# Patient Record
Sex: MALE | Race: BLACK OR AFRICAN AMERICAN | Employment: OTHER | URBAN - METROPOLITAN AREA
[De-identification: names, ages, dates, MRNs, and addresses within clinical notes are randomized per-mention and may not be internally consistent; named-entity substitution may affect disease eponyms.]

---

## 2018-07-19 ENCOUNTER — HOSPITAL ENCOUNTER (OUTPATIENT)
Dept: CT IMAGING | Age: 68
Discharge: HOME | End: 2018-07-19
Attending: FAMILY MEDICINE
Payer: COMMERCIAL

## 2018-07-19 DIAGNOSIS — R04.2 COUGHING UP BLOOD: ICD-10-CM

## 2018-07-19 LAB — CREAT BLD-MCNC: 0.8 MG/DL (ref 0.6–1.3)

## 2018-07-19 PROCEDURE — 71260 CT THORAX DX C+: CPT

## 2018-07-19 PROCEDURE — 74011250636 HC RX REV CODE- 250/636: Performed by: RADIOLOGY

## 2018-07-19 PROCEDURE — 74011636320 HC RX REV CODE- 636/320: Performed by: RADIOLOGY

## 2018-07-19 PROCEDURE — 82565 ASSAY OF CREATININE: CPT

## 2018-07-19 RX ORDER — SODIUM CHLORIDE 0.9 % (FLUSH) 0.9 %
10 SYRINGE (ML) INJECTION
Status: COMPLETED | OUTPATIENT
Start: 2018-07-19 | End: 2018-07-19

## 2018-07-19 RX ORDER — SODIUM CHLORIDE 9 MG/ML
50 INJECTION, SOLUTION INTRAVENOUS
Status: COMPLETED | OUTPATIENT
Start: 2018-07-19 | End: 2018-07-19

## 2018-07-19 RX ADMIN — Medication 10 ML: at 09:19

## 2018-07-19 RX ADMIN — IOPAMIDOL 80 ML: 612 INJECTION, SOLUTION INTRAVENOUS at 09:19

## 2018-07-19 RX ADMIN — SODIUM CHLORIDE 50 ML/HR: 900 INJECTION, SOLUTION INTRAVENOUS at 09:19

## 2019-03-01 ENCOUNTER — HOSPITAL ENCOUNTER (OUTPATIENT)
Dept: CT IMAGING | Age: 69
Discharge: HOME | End: 2019-03-01
Attending: INTERNAL MEDICINE
Payer: COMMERCIAL

## 2019-03-01 DIAGNOSIS — R04.2 HEMOPTYSIS: ICD-10-CM

## 2019-03-01 LAB — CREAT BLD-MCNC: 0.8 MG/DL (ref 0.6–1.3)

## 2019-03-01 PROCEDURE — 74011636320 HC RX REV CODE- 636/320: Performed by: INTERNAL MEDICINE

## 2019-03-01 PROCEDURE — 71275 CT ANGIOGRAPHY CHEST: CPT

## 2019-03-01 PROCEDURE — 82565 ASSAY OF CREATININE: CPT

## 2019-03-01 RX ORDER — SODIUM CHLORIDE 0.9 % (FLUSH) 0.9 %
10 SYRINGE (ML) INJECTION
Status: COMPLETED | OUTPATIENT
Start: 2019-03-01 | End: 2019-03-01

## 2019-03-01 RX ADMIN — Medication 10 ML: at 14:20

## 2019-03-01 RX ADMIN — IOPAMIDOL 100 ML: 755 INJECTION, SOLUTION INTRAVENOUS at 14:20

## 2019-03-13 ENCOUNTER — HOSPITAL ENCOUNTER (OUTPATIENT)
Age: 69
Setting detail: OUTPATIENT SURGERY
Discharge: HOME | End: 2019-03-13
Attending: INTERNAL MEDICINE | Admitting: INTERNAL MEDICINE
Payer: MEDICARE

## 2019-03-13 VITALS
WEIGHT: 222 LBS | HEART RATE: 74 BPM | DIASTOLIC BLOOD PRESSURE: 66 MMHG | HEIGHT: 68 IN | OXYGEN SATURATION: 90 % | TEMPERATURE: 97.9 F | RESPIRATION RATE: 13 BRPM | BODY MASS INDEX: 33.65 KG/M2 | SYSTOLIC BLOOD PRESSURE: 92 MMHG

## 2019-03-13 LAB
APPEARANCE FLD: ABNORMAL
COLOR FLD: COLORLESS
EOSINOPHIL NFR FLD MANUAL: 4 %
LYMPHOCYTES NFR FLD: 26 %
MESOTHL CELL NFR FLD: 2 %
MONOS+MACROS NFR FLD: 11 %
NEUTROPHILS NFR FLD: 57 %
NUC CELL # FLD: 80 /CU MM
RBC # FLD: >100 /CU MM
SPECIMEN SOURCE FLD: ABNORMAL

## 2019-03-13 PROCEDURE — 87070 CULTURE OTHR SPECIMN AEROBIC: CPT

## 2019-03-13 PROCEDURE — 89050 BODY FLUID CELL COUNT: CPT

## 2019-03-13 PROCEDURE — 74011250636 HC RX REV CODE- 250/636

## 2019-03-13 PROCEDURE — 87102 FUNGUS ISOLATION CULTURE: CPT

## 2019-03-13 PROCEDURE — 76040000019: Performed by: INTERNAL MEDICINE

## 2019-03-13 PROCEDURE — 77030022556 HC FCPS BIOP TIS ENDOSC BSC -B: Performed by: INTERNAL MEDICINE

## 2019-03-13 PROCEDURE — 88112 CYTOPATH CELL ENHANCE TECH: CPT

## 2019-03-13 PROCEDURE — 77030012699 HC VLV SUC CNTRL OCOA -A: Performed by: INTERNAL MEDICINE

## 2019-03-13 PROCEDURE — 87116 MYCOBACTERIA CULTURE: CPT

## 2019-03-13 PROCEDURE — 74011250636 HC RX REV CODE- 250/636: Performed by: INTERNAL MEDICINE

## 2019-03-13 PROCEDURE — 74011000250 HC RX REV CODE- 250: Performed by: INTERNAL MEDICINE

## 2019-03-13 RX ORDER — FENTANYL CITRATE 50 UG/ML
50 INJECTION, SOLUTION INTRAMUSCULAR; INTRAVENOUS
Status: DISCONTINUED | OUTPATIENT
Start: 2019-03-13 | End: 2019-03-13 | Stop reason: HOSPADM

## 2019-03-13 RX ORDER — FENTANYL CITRATE 50 UG/ML
INJECTION, SOLUTION INTRAMUSCULAR; INTRAVENOUS
Status: DISCONTINUED
Start: 2019-03-13 | End: 2019-03-13 | Stop reason: HOSPADM

## 2019-03-13 RX ORDER — LIDOCAINE HYDROCHLORIDE 20 MG/ML
5 SOLUTION OROPHARYNGEAL ONCE
Status: COMPLETED | OUTPATIENT
Start: 2019-03-13 | End: 2019-03-13

## 2019-03-13 RX ORDER — FLUMAZENIL 0.1 MG/ML
0.2 INJECTION INTRAVENOUS
Status: DISCONTINUED | OUTPATIENT
Start: 2019-03-13 | End: 2019-03-13 | Stop reason: HOSPADM

## 2019-03-13 RX ORDER — LIDOCAINE HYDROCHLORIDE 20 MG/ML
JELLY TOPICAL AS NEEDED
Status: DISCONTINUED | OUTPATIENT
Start: 2019-03-13 | End: 2019-03-13 | Stop reason: HOSPADM

## 2019-03-13 RX ORDER — NALOXONE HYDROCHLORIDE 0.4 MG/ML
0.4 INJECTION, SOLUTION INTRAMUSCULAR; INTRAVENOUS; SUBCUTANEOUS
Status: DISCONTINUED | OUTPATIENT
Start: 2019-03-13 | End: 2019-03-13 | Stop reason: HOSPADM

## 2019-03-13 RX ORDER — MIDAZOLAM HYDROCHLORIDE 1 MG/ML
INJECTION, SOLUTION INTRAMUSCULAR; INTRAVENOUS
Status: DISCONTINUED
Start: 2019-03-13 | End: 2019-03-13 | Stop reason: HOSPADM

## 2019-03-13 RX ORDER — SODIUM CHLORIDE 9 MG/ML
75 INJECTION, SOLUTION INTRAVENOUS CONTINUOUS
Status: DISCONTINUED | OUTPATIENT
Start: 2019-03-13 | End: 2019-03-13 | Stop reason: HOSPADM

## 2019-03-13 RX ORDER — MIDAZOLAM HYDROCHLORIDE 1 MG/ML
.25-5 INJECTION, SOLUTION INTRAMUSCULAR; INTRAVENOUS
Status: DISCONTINUED | OUTPATIENT
Start: 2019-03-13 | End: 2019-03-13 | Stop reason: HOSPADM

## 2019-03-13 RX ORDER — LANOLIN ALCOHOL/MO/W.PET/CERES
CREAM (GRAM) TOPICAL
COMMUNITY

## 2019-03-13 RX ORDER — LIDOCAINE HYDROCHLORIDE 40 MG/ML
30 SOLUTION TOPICAL AS NEEDED
Status: DISCONTINUED | OUTPATIENT
Start: 2019-03-13 | End: 2019-03-13 | Stop reason: HOSPADM

## 2019-03-13 RX ORDER — LIDOCAINE HYDROCHLORIDE 20 MG/ML
JELLY TOPICAL AS NEEDED
Status: DISCONTINUED | OUTPATIENT
Start: 2019-03-13 | End: 2019-03-13

## 2019-03-13 RX ORDER — SODIUM CHLORIDE 0.9 % (FLUSH) 0.9 %
5-40 SYRINGE (ML) INJECTION EVERY 8 HOURS
Status: DISCONTINUED | OUTPATIENT
Start: 2019-03-13 | End: 2019-03-13 | Stop reason: HOSPADM

## 2019-03-13 RX ORDER — SODIUM CHLORIDE 0.9 % (FLUSH) 0.9 %
5-40 SYRINGE (ML) INJECTION AS NEEDED
Status: DISCONTINUED | OUTPATIENT
Start: 2019-03-13 | End: 2019-03-13 | Stop reason: HOSPADM

## 2019-03-13 RX ADMIN — FENTANYL CITRATE 25 MCG: 50 INJECTION, SOLUTION INTRAMUSCULAR; INTRAVENOUS at 10:02

## 2019-03-13 RX ADMIN — FENTANYL CITRATE 25 MCG: 50 INJECTION, SOLUTION INTRAMUSCULAR; INTRAVENOUS at 09:59

## 2019-03-13 RX ADMIN — MIDAZOLAM HYDROCHLORIDE 0.5 MG: 1 INJECTION, SOLUTION INTRAMUSCULAR; INTRAVENOUS at 09:59

## 2019-03-13 RX ADMIN — LIDOCAINE HYDROCHLORIDE 5 ML: 20 SOLUTION ORAL; TOPICAL at 09:47

## 2019-03-13 RX ADMIN — SODIUM CHLORIDE 75 ML/HR: 900 INJECTION, SOLUTION INTRAVENOUS at 09:32

## 2019-03-13 RX ADMIN — LIDOCAINE HYDROCHLORIDE 20 ML: 40 SOLUTION TOPICAL at 10:02

## 2019-03-13 RX ADMIN — LIDOCAINE HYDROCHLORIDE: 20 JELLY TOPICAL at 10:00

## 2019-03-13 RX ADMIN — MIDAZOLAM HYDROCHLORIDE 0.5 MG: 1 INJECTION, SOLUTION INTRAMUSCULAR; INTRAVENOUS at 09:55

## 2019-03-13 RX ADMIN — FENTANYL CITRATE 25 MCG: 50 INJECTION, SOLUTION INTRAMUSCULAR; INTRAVENOUS at 09:55

## 2019-03-13 RX ADMIN — MIDAZOLAM HYDROCHLORIDE 0.5 MG: 1 INJECTION, SOLUTION INTRAMUSCULAR; INTRAVENOUS at 10:02

## 2019-03-13 NOTE — PROCEDURES
[facility]  Pulmonary, Critical Care, and Sleep Medicine    Name: Sameer Snyder. MRN: [id number]   : 1950 Hospital: [facility]   Date: 3/13/2019        Bronchoscopy Report    Procedure: Diagnostic bronchoscopy. Indication: Abnormal chest imaging and Hemoptysis    Consent/Treatment: Informed consent was obtained from the  patient after risks, benefits and alternatives were explained. Timeout verified the correct patient and correct procedure. Anesthesia:   Topical sedation to nares, mouth, and tracheobronchial tree with lidocaine  Moderate sedation with Fentanyl 75 mcg was used Versed 1.5mg. Both IV>   Moderate (conscious) sedation was administered by the endoscopy nurse and supervised by the endoscopist.  The following parameters were monitored: oxygen saturation, heart rate, blood pressure, respiratory rate, EKG, adequacy of pulmonary ventilation, and response to care. Total physician intraservice time was 20 minutes. Procedure Details:   -- The bronchoscope was introduced through the  right nare. -- The vocal cords were found to be normal.  -- The trachea and adam were completely inspected and were found to be normal.  -- The right-sided endobronchial anatomy was completely inspected and were found to be normal.  -- The left-sided endobronchial anatomy was completely inspected and were found to be normal.     Pt was placed in proper position. The scope was wedge into the RML  Into different subsegments and BAL was performed for a total of 6 lavages of 120 ml and return of 50 mls. Did not appear to be alveolvar hemorrhage with serial lavages.      Specimens:   Bronchial washings were sent for  microbiology, cytology, AFB smear and culture and fungal culture  The bronchoscope was wedged in the RML and bronchoalveolar lavage was performed; material was sent for  microbiology, cytology, AFB smear and culture and fungal culture      Complications: none    Estimated Blood Loss: Minimal    Adelia Yao MD

## 2019-03-13 NOTE — DISCHARGE INSTRUCTIONS
[facility] [place]  Pulmonary, Critical Care, and Sleep Medicine    Name: Alison Ramirez. MRN: [id number]   : 1950 Hospital: [facility]   Date: 3/13/2019        BRONCHOSCOPY DISCHARGE INSTRUCTIONS  Discomfort:  Sore throat- throat lozenges or warm salt water gargle  redness at IV site- apply warm compress to area; if redness or soreness persist- contact your physician  Gaseous discomfort- walking, belching will help relieve any discomfort  Should not operate a vehicle for at least 12 hours  You should not engage in an occupation involving machinery or appliances for rest of today  You should not drink alcoholic beverages for at least 12 hours  Avoid making any critical decisions for at least 24 hour  Blood tinged secretions - this should stop within 2-3 hours  DIET  Nothing by mouth- do not eat or drink for two hours. You may eat and drink after ***  ACTIVITY  You {Actions; may/not:75330:s} resume your normal daily activities however it is recommended that you spend the remainder of the day resting -  avoid any strenuous activity. CALL M.D. FOR ANY SIGN OF   Increasing pain, nausea, vomiting  New increased bleeding  Fever (chills)  Pain in chest area  Bloody discharge from nose or mouth  Shortness of breath  Bubbles under the skin around the collarbone. These may crackle and pop when you press on them. Coughing up more than ½ cup of blood. Call [phone] office for the following  Results of procedure / biopsy in 7 days  Telephone #  [phone]  Additional instructions: If you have not heard the results of your test by  please call the phone number listed above.

## 2019-03-13 NOTE — ROUTINE PROCESS
Alison Ramirez.  [birth year]  [id number]    Situation:  Verbal report received from: [facility] RN   Procedure: Procedure(s):  BRONCHOSCOPY  BRONCHIAL WASHINGS FLEXIBLE  BRONCHIAL ALVEOLAR LAVAGE    Background:    Preoperative diagnosis: HEMOPTYSIS  Postoperative diagnosis: hemoptysis    :  Dr. Emerson Vanessa  Assistant(s): Endoscopy RN-1: Stephania Ojeda RN  Endoscopy RN-2: Warren Vu RN    Specimens:   ID Type Source Tests Collected by Time Destination   1 : Right middle lobe Bronchial Washing Bronchoalveolar Lavage (BAL) CULTURE, FUNGUS, AFB CULTURE + SMEAR W/RFLX PCR AND ID, CULTURE, RESPIRATORY/SPUTUM/BRONCH W GRAM STAIN, CELL COUNT, BODY FLUID Germania Brown MD 3/13/2019 1012 Microbiology   1 : right middle lobe Fresh Bronchoalveolar Lavage (BAL)  Germania Brown MD 3/13/2019 1014 Cytology     H. Pylori  no    Assessment:  Intra-procedure medications   Versed  1.5 mg  Fentanyl 75 mcg    Anesthesia gave intra-procedure sedation and medications, see anesthesia flow sheet no      Intravenous fluids: NS@ KVO     Vital signs stable       Abdominal assessment: round and soft       Recommendation:  Discharge patient per MD order  .   Return to floor    Family or [address] [place] [address] fiance  Permission to share finding with family or friend yes

## 2019-03-15 LAB
BACTERIA SPEC CULT: NORMAL
GRAM STN SPEC: NORMAL
GRAM STN SPEC: NORMAL
SERVICE CMNT-IMP: NORMAL

## 2019-04-15 LAB
BACTERIA SPEC CULT: NORMAL
SERVICE CMNT-IMP: NORMAL

## 2019-04-26 LAB
ACID FAST STN SPEC: NEGATIVE
MYCOBACTERIUM SPEC QL CULT: NEGATIVE
SPECIMEN PREPARATION: NORMAL
SPECIMEN SOURCE: NORMAL

## 2019-12-27 ENCOUNTER — HOSPITAL ENCOUNTER (EMERGENCY)
Age: 69
Discharge: HOME | End: 2019-12-27
Attending: EMERGENCY MEDICINE
Payer: MEDICARE

## 2019-12-27 ENCOUNTER — APPOINTMENT (OUTPATIENT)
Dept: GENERAL RADIOLOGY | Age: 69
End: 2019-12-27
Attending: EMERGENCY MEDICINE
Payer: MEDICARE

## 2019-12-27 VITALS
WEIGHT: 244.27 LBS | BODY MASS INDEX: 36.18 KG/M2 | SYSTOLIC BLOOD PRESSURE: 192 MMHG | DIASTOLIC BLOOD PRESSURE: 111 MMHG | HEIGHT: 69 IN | OXYGEN SATURATION: 96 % | RESPIRATION RATE: 18 BRPM | HEART RATE: 91 BPM | TEMPERATURE: 97.8 F

## 2019-12-27 DIAGNOSIS — R03.0 ELEVATED BLOOD PRESSURE READING: ICD-10-CM

## 2019-12-27 DIAGNOSIS — J18.9 COMMUNITY ACQUIRED PNEUMONIA OF RIGHT MIDDLE LOBE OF LUNG: Primary | ICD-10-CM

## 2019-12-27 LAB
ALBUMIN SERPL-MCNC: 3.8 G/DL (ref 3.5–5)
ALBUMIN/GLOB SERPL: 1 {RATIO} (ref 1.1–2.2)
ALP SERPL-CCNC: 55 U/L (ref 45–117)
ALT SERPL-CCNC: 39 U/L (ref 12–78)
ANION GAP SERPL CALC-SCNC: 3 MMOL/L (ref 5–15)
AST SERPL-CCNC: 27 U/L (ref 15–37)
BASOPHILS # BLD: 0.1 K/UL (ref 0–0.1)
BASOPHILS NFR BLD: 1 % (ref 0–1)
BILIRUB SERPL-MCNC: 0.6 MG/DL (ref 0.2–1)
BUN SERPL-MCNC: 13 MG/DL (ref 6–20)
BUN/CREAT SERPL: 14 (ref 12–20)
CALCIUM SERPL-MCNC: 9.3 MG/DL (ref 8.5–10.1)
CHLORIDE SERPL-SCNC: 107 MMOL/L (ref 97–108)
CO2 SERPL-SCNC: 30 MMOL/L (ref 21–32)
CREAT SERPL-MCNC: 0.92 MG/DL (ref 0.7–1.3)
DIFFERENTIAL METHOD BLD: ABNORMAL
EOSINOPHIL # BLD: 0.3 K/UL (ref 0–0.4)
EOSINOPHIL NFR BLD: 4 % (ref 0–7)
ERYTHROCYTE [DISTWIDTH] IN BLOOD BY AUTOMATED COUNT: 15.2 % (ref 11.5–14.5)
GLOBULIN SER CALC-MCNC: 3.8 G/DL (ref 2–4)
GLUCOSE SERPL-MCNC: 78 MG/DL (ref 65–100)
HCT VFR BLD AUTO: 36 % (ref 36.6–50.3)
HGB BLD-MCNC: 11.5 G/DL (ref 12.1–17)
IMM GRANULOCYTES # BLD AUTO: 0.1 K/UL (ref 0–0.04)
IMM GRANULOCYTES NFR BLD AUTO: 2 % (ref 0–0.5)
LYMPHOCYTES # BLD: 1.8 K/UL (ref 0.8–3.5)
LYMPHOCYTES NFR BLD: 23 % (ref 12–49)
MCH RBC QN AUTO: 27.9 PG (ref 26–34)
MCHC RBC AUTO-ENTMCNC: 31.9 G/DL (ref 30–36.5)
MCV RBC AUTO: 87.4 FL (ref 80–99)
MONOCYTES # BLD: 0.6 K/UL (ref 0–1)
MONOCYTES NFR BLD: 7 % (ref 5–13)
NEUTS SEG # BLD: 4.8 K/UL (ref 1.8–8)
NEUTS SEG NFR BLD: 63 % (ref 32–75)
NRBC # BLD: 0.03 K/UL (ref 0–0.01)
NRBC BLD-RTO: 0.4 PER 100 WBC
PLATELET # BLD AUTO: 318 K/UL (ref 150–400)
PMV BLD AUTO: 10.6 FL (ref 8.9–12.9)
POTASSIUM SERPL-SCNC: 3.8 MMOL/L (ref 3.5–5.1)
PROT SERPL-MCNC: 7.6 G/DL (ref 6.4–8.2)
RBC # BLD AUTO: 4.12 M/UL (ref 4.1–5.7)
SODIUM SERPL-SCNC: 140 MMOL/L (ref 136–145)
WBC # BLD AUTO: 7.7 K/UL (ref 4.1–11.1)

## 2019-12-27 PROCEDURE — 80053 COMPREHEN METABOLIC PANEL: CPT

## 2019-12-27 PROCEDURE — 71046 X-RAY EXAM CHEST 2 VIEWS: CPT

## 2019-12-27 PROCEDURE — 85025 COMPLETE CBC W/AUTO DIFF WBC: CPT

## 2019-12-27 PROCEDURE — 36415 COLL VENOUS BLD VENIPUNCTURE: CPT

## 2019-12-27 PROCEDURE — 99283 EMERGENCY DEPT VISIT LOW MDM: CPT

## 2019-12-27 RX ORDER — ALBUTEROL SULFATE 90 UG/1
1-2 AEROSOL, METERED RESPIRATORY (INHALATION)
Qty: 1 INHALER | Refills: 0 | Status: SHIPPED | OUTPATIENT
Start: 2019-12-27

## 2019-12-27 RX ORDER — LORATADINE 10 MG/1
10 TABLET ORAL
COMMUNITY
End: 2022-05-04

## 2019-12-27 RX ORDER — AZITHROMYCIN 250 MG/1
TABLET, FILM COATED ORAL
Qty: 6 TAB | Refills: 0 | Status: SHIPPED | OUTPATIENT
Start: 2019-12-27 | End: 2020-01-01

## 2019-12-27 RX ORDER — BENZONATATE 100 MG/1
100 CAPSULE ORAL
Qty: 30 CAP | Refills: 0 | Status: SHIPPED | OUTPATIENT
Start: 2019-12-27 | End: 2020-01-06

## 2019-12-27 NOTE — DISCHARGE INSTRUCTIONS
Patient Education        Pneumonia: Care Instructions  Your Care Instructions    Pneumonia is an infection of the lungs. Most cases are caused by infections from bacteria or viruses. Pneumonia may be mild or very severe. If it is caused by bacteria, you will be treated with antibiotics. It may take a few weeks to a few months to recover fully from pneumonia, depending on how sick you were and whether your overall health is good. Follow-up care is a key part of your treatment and safety. Be sure to make and go to all appointments, and call your doctor if you are having problems. It's also a good idea to know your test results and keep a list of the medicines you take. How can you care for yourself at home? · Take your antibiotics exactly as directed. Do not stop taking the medicine just because you are feeling better. You need to take the full course of antibiotics. · Take your medicines exactly as prescribed. Call your doctor if you think you are having a problem with your medicine. · Get plenty of rest and sleep. You may feel weak and tired for a while, but your energy level will improve with time. · To prevent dehydration, drink plenty of fluids, enough so that your urine is light yellow or clear like water. Choose water and other caffeine-free clear liquids until you feel better. If you have kidney, heart, or liver disease and have to limit fluids, talk with your doctor before you increase the amount of fluids you drink. · Take care of your cough so you can rest. A cough that brings up mucus from your lungs is common with pneumonia. It is one way your body gets rid of the infection. But if coughing keeps you from resting or causes severe fatigue and chest-wall pain, talk to your doctor. He or she may suggest that you take a medicine to reduce the cough. · Use a vaporizer or humidifier to add moisture to your bedroom. Follow the directions for cleaning the machine.   · Do not smoke or allow others to smoke around you. Smoke will make your cough last longer. If you need help quitting, talk to your doctor about stop-smoking programs and medicines. These can increase your chances of quitting for good. · Take an over-the-counter pain medicine, such as acetaminophen (Tylenol), ibuprofen (Advil, Motrin), or naproxen (Aleve). Read and follow all instructions on the label. · Do not take two or more pain medicines at the same time unless the doctor told you to. Many pain medicines have acetaminophen, which is Tylenol. Too much acetaminophen (Tylenol) can be harmful. · If you were given a spirometer to measure how well your lungs are working, use it as instructed. This can help your doctor tell how your recovery is going. · To prevent pneumonia in the future, talk to your doctor about getting a flu vaccine (once a year) and a pneumococcal vaccine (one time only for most people). When should you call for help? Call 911 anytime you think you may need emergency care. For example, call if:    · You have severe trouble breathing.    Call your doctor now or seek immediate medical care if:    · You cough up dark brown or bloody mucus (sputum).     · You have new or worse trouble breathing.     · You are dizzy or lightheaded, or you feel like you may faint.    Watch closely for changes in your health, and be sure to contact your doctor if:    · You have a new or higher fever.     · You are coughing more deeply or more often.     · You are not getting better after 2 days (48 hours).     · You do not get better as expected. Where can you learn more? Go to http://barry-milo.info/. Enter [id number] in the search box to learn more about \"Pneumonia: Care Instructions. \"  Current as of: June 9, 2019  Content Version: 12.2  © 3159-7482 [employer]. Care instructions adapted under license by [employer] (which disclaims liability or warranty for this information).  If you have questions about a medical condition or this instruction, always ask your healthcare professional. [address] any warranty or liability for your use of this information. Patient Education        Elevated Blood Pressure: Care Instructions  Your Care Instructions    Blood pressure is a measure of how hard the blood pushes against the walls of your arteries. It's normal for blood pressure to go up and down throughout the day. But if it stays up over time, you have high blood pressure. Two numbers tell you your blood pressure. The first number is the systolic pressure. It shows how hard the blood pushes when your heart is pumping. The second number is the diastolic pressure. It shows how hard the blood pushes between heartbeats, when your heart is relaxed and filling with blood. An ideal blood pressure in adults is less than 120/80 (say \"120 over 80\"). High blood pressure is 140/90 or higher. You have high blood pressure if your top number is 140 or higher or your bottom number is 90 or higher, or both. The main test for high blood pressure is simple, fast, and painless. To diagnose high blood pressure, your doctor will test your blood pressure at different times. After testing your blood pressure, your doctor may ask you to test it again when you are home. If you are diagnosed with high blood pressure, you can work with your doctor to make a long-term plan to manage it. Follow-up care is a key part of your treatment and safety. Be sure to make and go to all appointments, and call your doctor if you are having problems. It's also a good idea to know your test results and keep a list of the medicines you take. How can you care for yourself at home? · Do not smoke. Smoking increases your risk for heart attack and stroke. If you need help quitting, talk to your doctor about stop-smoking programs and medicines. These can increase your chances of quitting for good. · Stay at a healthy weight.   · Try to limit how much sodium you eat to less than 2,300 milligrams (mg) a day. Your doctor may ask you to try to eat less than 1,500 mg a day. · Be physically active. Get at least 30 minutes of exercise on most days of the week. Walking is a good choice. You also may want to do other activities, such as running, swimming, cycling, or playing tennis or team sports. · Avoid or limit alcohol. Talk to your doctor about whether you can drink any alcohol. · Eat plenty of fruits, vegetables, and low-fat dairy products. Eat less saturated and total fats. · Learn how to check your blood pressure at home. When should you call for help? Call your doctor now or seek immediate medical care if:  ? · Your blood pressure is much higher than normal (such as 180/110 or higher). ? · You think high blood pressure is causing symptoms such as:  ¨ Severe headache. ¨ Blurry vision. ? Watch closely for changes in your health, and be sure to contact your doctor if:  ? · You do not get better as expected. Where can you learn more? Go to http://barry-milo.info/. Enter Q776 in the search box to learn more about \"Elevated Blood Pressure: Care Instructions. \"  Current as of: September 21, 2016  Content Version: 11.4  © 3493-2028 [employer]. Care instructions adapted under license by [employer] (which disclaims liability or warranty for this information). If you have questions about a medical condition or this instruction, always ask your healthcare professional. [place] [address] any warranty or liability for your use of this information.

## 2019-12-27 NOTE — ED PROVIDER NOTES
EMERGENCY DEPARTMENT HISTORY AND PHYSICAL EXAM      Date: 12/27/2019  Patient Name: Hola Henry. History of Presenting Illness     Chief Complaint   Patient presents with    Cough     Patient presents after coughing up blood since Oct. Patient reports he has had all kind of test.       History Provided By: Patient    HPI: Hola Henry., 71 y.o. male presents by POV to the ED with cc of hemoptysis. He notes that this is the 2nd time this has happened. It occurred once about 15 months ago. He was seen by pulmonologist who couldn't find anything wrong. The Sx resumed about 3 months ago. This is an intermittent problem that worsened over the past week. He notes a productive cough, SOB, congestion and rhinorrhea. He denies fever/chills. There has been no treatment PTA. There are no other complaints, changes, or physical findings at this time. Social Hx: Tobacco (denies), EtOH (social), Illicit drug use (denies)     PCP: Mohamud Goode MD    No current facility-administered medications on file prior to encounter. Current Outpatient Medications on File Prior to Encounter   Medication Sig Dispense Refill    loratadine (CLARITIN) 10 mg tablet Take 10 mg by mouth.  ferrous sulfate (IRON) 325 mg (65 mg iron) tablet Take  by mouth Daily (before breakfast).  multivitamin (ONE A DAY) tablet Take 1 Tab by mouth daily. Past History     Past Medical History:  Past Medical History:   Diagnosis Date    Cancer [facility])     prostate       Past Surgical History:  Past Surgical History:   Procedure Laterality Date    HX ORTHOPAEDIC  1999    spinal fusion       Family History:  Family History   Problem Relation Age of Onset    Lung Disease Mother     Cancer Father        Social History:  Social History     Tobacco Use    Smoking status: Never Smoker    Smokeless tobacco: Never Used   Substance Use Topics    Alcohol use:  Yes     Alcohol/week: 5.0 standard drinks     Types: 6 Shots of liquor per week    Drug use: No       Allergies:  No Known Allergies      Review of Systems   Review of Systems   Constitutional: Negative for chills, diaphoresis and fever. HENT: Positive for congestion and rhinorrhea. Negative for ear pain and sore throat. Respiratory: Positive for cough and shortness of breath. Cardiovascular: Negative for chest pain. Gastrointestinal: Negative for abdominal pain, constipation, diarrhea, nausea and vomiting. Genitourinary: Negative for difficulty urinating, dysuria, frequency and hematuria. Musculoskeletal: Negative for arthralgias and myalgias. Neurological: Negative for dizziness, syncope, weakness and headaches. All other systems reviewed and are negative. Physical Exam   Physical Exam  Vitals signs and nursing note reviewed. Constitutional:       General: He is not in acute distress. Appearance: He is well-developed. He is not diaphoretic. Comments: 71 y.o. -American male    HENT:      Head: Normocephalic and atraumatic. Right Ear: Tympanic membrane and ear canal normal. No middle ear effusion. Left Ear: Tympanic membrane and ear canal normal.  No middle ear effusion. Nose: Congestion and rhinorrhea present. Mouth/Throat:      Mouth: Mucous membranes are moist.   Eyes:      General:         Right eye: No discharge. Left eye: No discharge. Conjunctiva/sclera: Conjunctivae normal.   Neck:      Musculoskeletal: Normal range of motion and neck supple. Cardiovascular:      Rate and Rhythm: Normal rate and regular rhythm. Heart sounds: Normal heart sounds. No murmur. Pulmonary:      Effort: Pulmonary effort is normal. No respiratory distress. Breath sounds: Normal breath sounds. Skin:     General: Skin is warm and dry. Neurological:      Mental Status: He is alert and oriented to person, place, and time.    Psychiatric:         Behavior: Behavior normal.         Diagnostic Study Results Labs -     Recent Results (from the past 12 hour(s))   CBC WITH AUTOMATED DIFF    Collection Time: 12/27/19  3:19 PM   Result Value Ref Range    WBC 7.7 4.1 - 11.1 K/uL    RBC 4.12 4.10 - 5.70 M/uL    HGB 11.5 (L) 12.1 - 17.0 g/dL    HCT 36.0 (L) 36.6 - 50.3 %    MCV 87.4 80.0 - 99.0 FL    MCH 27.9 26.0 - 34.0 PG    MCHC 31.9 30.0 - 36.5 g/dL    RDW 15.2 (H) 11.5 - 14.5 %    PLATELET 070 785 - 151 K/uL    MPV 10.6 8.9 - 12.9 FL    NRBC 0.4 (H) 0  WBC    ABSOLUTE NRBC 0.03 (H) 0.00 - 0.01 K/uL    NEUTROPHILS 63 32 - 75 %    LYMPHOCYTES 23 12 - 49 %    MONOCYTES 7 5 - 13 %    EOSINOPHILS 4 0 - 7 %    BASOPHILS 1 0 - 1 %    IMMATURE GRANULOCYTES 2 (H) 0.0 - 0.5 %    ABS. NEUTROPHILS 4.8 1.8 - 8.0 K/UL    ABS. LYMPHOCYTES 1.8 0.8 - 3.5 K/UL    ABS. MONOCYTES 0.6 0.0 - 1.0 K/UL    ABS. EOSINOPHILS 0.3 0.0 - 0.4 K/UL    ABS. BASOPHILS 0.1 0.0 - 0.1 K/UL    ABS. IMM. GRANS. 0.1 (H) 0.00 - 0.04 K/UL    DF AUTOMATED     METABOLIC PANEL, COMPREHENSIVE    Collection Time: 12/27/19  3:19 PM   Result Value Ref Range    Sodium 140 136 - 145 mmol/L    Potassium 3.8 3.5 - 5.1 mmol/L    Chloride 107 97 - 108 mmol/L    CO2 30 21 - 32 mmol/L    Anion gap 3 (L) 5 - 15 mmol/L    Glucose 78 65 - 100 mg/dL    BUN 13 6 - 20 MG/DL    Creatinine 0.92 0.70 - 1.30 MG/DL    BUN/Creatinine ratio 14 12 - 20      GFR est AA >60 >60 ml/min/1.73m2    GFR est non-AA >60 >60 ml/min/1.73m2    Calcium 9.3 8.5 - 10.1 MG/DL    Bilirubin, total 0.6 0.2 - 1.0 MG/DL    ALT (SGPT) 39 12 - 78 U/L    AST (SGOT) 27 15 - 37 U/L    Alk. phosphatase 55 45 - 117 U/L    Protein, total 7.6 6.4 - 8.2 g/dL    Albumin 3.8 3.5 - 5.0 g/dL    Globulin 3.8 2.0 - 4.0 g/dL    A-G Ratio 1.0 (L) 1.1 - 2.2         Radiologic Studies -   CXR Results  (Last 48 hours)               12/27/19 1523  XR CHEST PA LAT Final result    Impression:  IMPRESSION:        Airspace disease in the right upper lobe and right middle lobe, suspicious for   pneumonia. Narrative:  EXAM:  XR CHEST PA LAT       INDICATION:  cough       COMPARISON:  11/24/2014        FINDINGS:       PA and lateral radiographs of the chest demonstrate airspace disease in the   right upper lobe and right middle lobe, suspicious for pneumonia. The left lung   is clear. No pleural fluid is demonstrated. The cardiac and mediastinal   contours and pulmonary vascularity are normal.   Postsurgical changes are noted   in the lower cervical spine. Medical Decision Making   I am the first provider for this patient. I reviewed the vital signs, available nursing notes, past medical history, past surgical history, family history and social history. Vital Signs-Reviewed the patient's vital signs. Patient Vitals for the past 12 hrs:   Temp Pulse Resp BP SpO2   12/27/19 1651    (!) 192/111    12/27/19 1649  91 18 (!) 207/115 96 %   12/27/19 1504 97.8 °F (36.6 °C) 95 18 (!) 179/91 93 %       Records Reviewed: Nursing Notes    Provider Notes (Medical Decision Making):   Bronchitis, pneumonia, TB, URI,     ED Course:   Initial assessment performed. The patients presenting problems have been discussed, and they are in agreement with the care plan formulated and outlined with them. I have encouraged them to ask questions as they arise throughout their visit. Critical Care Time: None    Disposition:  DISCHARGE NOTE:  5:39 PM  The pt is ready for discharge. The pt's signs, symptoms, diagnosis, and discharge instructions have been discussed and pt has conveyed their understanding. The pt is to follow up as recommended or return to ER should their symptoms worsen. Plan has been discussed and pt is in agreement. PLAN:  1.    Current Discharge Medication List      START taking these medications    Details   azithromycin (ZITHROMAX Z-NOMI) 250 mg tablet Take 2 tabs today; then take 1 tab daily for 4 days  Qty: 6 Tab, Refills: 0      benzonatate (TESSALON PERLES) 100 mg capsule Take 1 Cap by mouth three (3) times daily as needed for Cough for up to 10 days. Qty: 30 Cap, Refills: 0      albuterol (PROAIR HFA) 90 mcg/actuation inhaler Take 1-2 Puffs by inhalation every four (4) hours as needed for Wheezing. Qty: 1 Inhaler, Refills: 0         CONTINUE these medications which have NOT CHANGED    Details   loratadine (CLARITIN) 10 mg tablet Take 10 mg by mouth. ferrous sulfate (IRON) 325 mg (65 mg iron) tablet Take  by mouth Daily (before breakfast). multivitamin (ONE A DAY) tablet Take 1 Tab by mouth daily. 2.   Follow-up Information     Follow up With Specialties Details Why Contact Info    Loyda Yen MD Pulmonary Disease In 1 week as scheduled on 1/10/20 [employer] [facility]  P[facility]  [phone]          Return to ED if worse     Diagnosis     Clinical Impression:   1. Community acquired pneumonia of right middle lobe of lung ([address]    2. Elevated blood pressure reading          Please note that this dictation was completed with [employer], the computer voice recognition software. Quite often unanticipated grammatical, syntax, homophones, and other interpretive errors are inadvertently transcribed by the computer software. Please disregards these errors. Please excuse any errors that have escaped final proofreading. This note will not be viewable in [address]

## 2019-12-27 NOTE — ED NOTES
Discharge instructions provided to patient. VSS. Patient refuses wheelchair and ambulatroy to discharge with steady gait.

## 2020-05-27 ENCOUNTER — HOSPITAL ENCOUNTER (OUTPATIENT)
Dept: GENERAL RADIOLOGY | Age: 70
Discharge: HOME | End: 2020-05-27
Attending: INTERNAL MEDICINE
Payer: MEDICARE

## 2020-05-27 DIAGNOSIS — C61 MALIGNANT NEOPLASM OF PROSTATE (HCC): ICD-10-CM

## 2020-05-27 DIAGNOSIS — R79.89 OTHER SPECIFIED ABNORMAL FINDINGS OF BLOOD CHEMISTRY: ICD-10-CM

## 2020-05-27 PROCEDURE — 77075 RADEX OSSEOUS SURVEY COMPL: CPT

## 2021-04-01 ENCOUNTER — TRANSCRIBE ORDER (OUTPATIENT)
Dept: SCHEDULING | Age: 71
End: 2021-04-01

## 2021-04-01 DIAGNOSIS — R04.2 HEMOPTYSIS: Primary | ICD-10-CM

## 2021-04-13 ENCOUNTER — HOSPITAL ENCOUNTER (OUTPATIENT)
Dept: CT IMAGING | Age: 71
Discharge: HOME | End: 2021-04-13
Attending: INTERNAL MEDICINE
Payer: MEDICARE

## 2021-04-13 DIAGNOSIS — R04.2 HEMOPTYSIS: ICD-10-CM

## 2021-04-13 LAB — CREAT BLD-MCNC: 0.8 MG/DL (ref 0.6–1.3)

## 2021-04-13 PROCEDURE — 82565 ASSAY OF CREATININE: CPT

## 2021-04-13 PROCEDURE — 74011000636 HC RX REV CODE- 636

## 2021-04-13 PROCEDURE — 71275 CT ANGIOGRAPHY CHEST: CPT

## 2021-04-13 RX ADMIN — IOPAMIDOL 100 ML: 755 INJECTION, SOLUTION INTRAVENOUS at 14:24

## 2021-04-15 ENCOUNTER — TRANSCRIBE ORDER (OUTPATIENT)
Dept: SCHEDULING | Age: 71
End: 2021-04-15

## 2021-04-15 DIAGNOSIS — R59.0 HILAR ADENOPATHY: Primary | ICD-10-CM

## 2021-05-10 ENCOUNTER — TRANSCRIBE ORDER (OUTPATIENT)
Dept: SCHEDULING | Age: 71
End: 2021-05-10

## 2021-05-10 DIAGNOSIS — R79.89 OTHER SPECIFIED ABNORMAL FINDINGS OF BLOOD CHEMISTRY: ICD-10-CM

## 2021-05-10 DIAGNOSIS — D47.2 SMOLDERING MYELOMA: Primary | ICD-10-CM

## 2021-05-10 DIAGNOSIS — C61 MALIGNANT NEOPLASM OF PROSTATE (HCC): ICD-10-CM

## 2021-05-10 DIAGNOSIS — D47.2 MONOCLONAL GAMMOPATHY: ICD-10-CM

## 2021-05-14 ENCOUNTER — TRANSCRIBE ORDER (OUTPATIENT)
Dept: SCHEDULING | Age: 71
End: 2021-05-14

## 2021-05-14 DIAGNOSIS — D64.9 ANEMIA: Primary | ICD-10-CM

## 2021-05-14 DIAGNOSIS — D47.2 MONOCLONAL GAMMOPATHY: ICD-10-CM

## 2021-05-14 DIAGNOSIS — C61 MALIGNANT NEOPLASM OF PROSTATE (HCC): ICD-10-CM

## 2021-05-26 NOTE — PROGRESS NOTES
Have you been tested for COVID-19 in the past 7 days? NO    Do you have a personal history of COVID-19 within the past 28 days? NO  If Yes, What was the method of testing: clinical assumption or test result? Have you had close contact with a known to be positive COVID-19 patient within the past 14 days? NO    Are you a healthcare worker or ? NO  If Yes, have you been exposed to COVID-19 without proper PPE? Do you live in a SNF, adult home or other institutional setting? NO   If Yes, have they experienced a flood of COVID-19 positive patients?     In the past 2-14 days have you had any of the following symptoms    Cough   New onset Shortness of breath or difficulty breathing    Or at least two of these symptoms:   Fever greater than 100 F   Chills   Repeated shaking with chills   Muscle pain   Headache   Sore throat   New loss of taste or smell   New onset diarrhea    NO

## 2021-05-27 ENCOUNTER — HOSPITAL ENCOUNTER (OUTPATIENT)
Dept: CT IMAGING | Age: 71
Discharge: HOME | End: 2021-05-27
Attending: INTERNAL MEDICINE
Payer: MEDICARE

## 2021-05-27 ENCOUNTER — HOSPITAL ENCOUNTER (OUTPATIENT)
Dept: GENERAL RADIOLOGY | Age: 71
Discharge: HOME | End: 2021-05-27
Attending: INTERNAL MEDICINE
Payer: MEDICARE

## 2021-05-27 VITALS
OXYGEN SATURATION: 98 % | TEMPERATURE: 98.6 F | DIASTOLIC BLOOD PRESSURE: 57 MMHG | SYSTOLIC BLOOD PRESSURE: 134 MMHG | RESPIRATION RATE: 20 BRPM | HEART RATE: 78 BPM

## 2021-05-27 DIAGNOSIS — C61 MALIGNANT NEOPLASM OF PROSTATE (HCC): ICD-10-CM

## 2021-05-27 DIAGNOSIS — D64.9 ANEMIA: ICD-10-CM

## 2021-05-27 DIAGNOSIS — R79.89 OTHER SPECIFIED ABNORMAL FINDINGS OF BLOOD CHEMISTRY: ICD-10-CM

## 2021-05-27 DIAGNOSIS — D47.2 MONOCLONAL GAMMOPATHY: ICD-10-CM

## 2021-05-27 LAB
BASOPHILS # BLD: 0.1 K/UL (ref 0–0.1)
BASOPHILS NFR BLD: 1 % (ref 0–1)
DIFFERENTIAL METHOD BLD: ABNORMAL
EOSINOPHIL # BLD: 0.4 K/UL (ref 0–0.4)
EOSINOPHIL NFR BLD: 5 % (ref 0–7)
ERYTHROCYTE [DISTWIDTH] IN BLOOD BY AUTOMATED COUNT: 16.7 % (ref 11.5–14.5)
HCT VFR BLD AUTO: 35.2 % (ref 36.6–50.3)
HGB BLD-MCNC: 11.1 G/DL (ref 12.1–17)
IMM GRANULOCYTES # BLD AUTO: 0 K/UL (ref 0–0.04)
IMM GRANULOCYTES NFR BLD AUTO: 1 % (ref 0–0.5)
LYMPHOCYTES # BLD: 1.4 K/UL (ref 0.8–3.5)
LYMPHOCYTES NFR BLD: 20 % (ref 12–49)
MCH RBC QN AUTO: 25.4 PG (ref 26–34)
MCHC RBC AUTO-ENTMCNC: 31.5 G/DL (ref 30–36.5)
MCV RBC AUTO: 80.5 FL (ref 80–99)
MONOCYTES # BLD: 0.7 K/UL (ref 0–1)
MONOCYTES NFR BLD: 9 % (ref 5–13)
NEUTS SEG # BLD: 4.6 K/UL (ref 1.8–8)
NEUTS SEG NFR BLD: 64 % (ref 32–75)
NRBC # BLD: 0 K/UL (ref 0–0.01)
NRBC BLD-RTO: 0 PER 100 WBC
PLATELET # BLD AUTO: 265 K/UL (ref 150–400)
PMV BLD AUTO: 10.7 FL (ref 8.9–12.9)
RBC # BLD AUTO: 4.37 M/UL (ref 4.1–5.7)
WBC # BLD AUTO: 7.2 K/UL (ref 4.1–11.1)

## 2021-05-27 PROCEDURE — 36415 COLL VENOUS BLD VENIPUNCTURE: CPT

## 2021-05-27 PROCEDURE — 77030014115

## 2021-05-27 PROCEDURE — 88185 FLOWCYTOMETRY/TC ADD-ON: CPT

## 2021-05-27 PROCEDURE — 85025 COMPLETE CBC W/AUTO DIFF WBC: CPT

## 2021-05-27 PROCEDURE — 88305 TISSUE EXAM BY PATHOLOGIST: CPT

## 2021-05-27 PROCEDURE — 74011250636 HC RX REV CODE- 250/636: Performed by: INTERNAL MEDICINE

## 2021-05-27 PROCEDURE — 88237 TISSUE CULTURE BONE MARROW: CPT

## 2021-05-27 PROCEDURE — 88342 IMHCHEM/IMCYTCHM 1ST ANTB: CPT

## 2021-05-27 PROCEDURE — 77030028872 HC BN BIOP NDL ON CNTRL TY TELE -C

## 2021-05-27 PROCEDURE — 77075 RADEX OSSEOUS SURVEY COMPL: CPT

## 2021-05-27 PROCEDURE — 99152 MOD SED SAME PHYS/QHP 5/>YRS: CPT

## 2021-05-27 PROCEDURE — 88311 DECALCIFY TISSUE: CPT

## 2021-05-27 PROCEDURE — 88184 FLOWCYTOMETRY/ TC 1 MARKER: CPT

## 2021-05-27 PROCEDURE — 2709999900 HC NON-CHARGEABLE SUPPLY

## 2021-05-27 PROCEDURE — 88364 INSITU HYBRIDIZATION (FISH): CPT

## 2021-05-27 PROCEDURE — 88365 INSITU HYBRIDIZATION (FISH): CPT

## 2021-05-27 PROCEDURE — 88264 CHROMOSOME ANALYSIS 20-25: CPT

## 2021-05-27 PROCEDURE — 77030003666 HC NDL SPINAL BD -A

## 2021-05-27 PROCEDURE — 88313 SPECIAL STAINS GROUP 2: CPT

## 2021-05-27 PROCEDURE — 88374 M/PHMTRC ALYS ISHQUANT/SEMIQ: CPT

## 2021-05-27 PROCEDURE — 77030018781

## 2021-05-27 RX ORDER — SODIUM CHLORIDE 9 MG/ML
25 INJECTION, SOLUTION INTRAVENOUS CONTINUOUS
Status: DISCONTINUED | OUTPATIENT
Start: 2021-05-27 | End: 2021-05-27

## 2021-05-27 RX ORDER — FENTANYL CITRATE 50 UG/ML
100 INJECTION, SOLUTION INTRAMUSCULAR; INTRAVENOUS
Status: DISCONTINUED | OUTPATIENT
Start: 2021-05-27 | End: 2021-05-27

## 2021-05-27 RX ORDER — MIDAZOLAM HYDROCHLORIDE 1 MG/ML
1-5 INJECTION, SOLUTION INTRAMUSCULAR; INTRAVENOUS
Status: DISCONTINUED | OUTPATIENT
Start: 2021-05-27 | End: 2021-05-27

## 2021-05-27 RX ADMIN — SODIUM CHLORIDE 25 ML/HR: 9 INJECTION, SOLUTION INTRAVENOUS at 11:08

## 2021-05-27 RX ADMIN — FENTANYL CITRATE 50 MCG: 50 INJECTION, SOLUTION INTRAMUSCULAR; INTRAVENOUS at 11:13

## 2021-05-27 RX ADMIN — MIDAZOLAM HYDROCHLORIDE 1 MG: 1 INJECTION, SOLUTION INTRAMUSCULAR; INTRAVENOUS at 11:23

## 2021-05-27 RX ADMIN — FENTANYL CITRATE 50 MCG: 50 INJECTION, SOLUTION INTRAMUSCULAR; INTRAVENOUS at 11:16

## 2021-05-27 RX ADMIN — MIDAZOLAM HYDROCHLORIDE 2 MG: 1 INJECTION, SOLUTION INTRAMUSCULAR; INTRAVENOUS at 11:13

## 2021-05-27 RX ADMIN — MIDAZOLAM HYDROCHLORIDE 2 MG: 1 INJECTION, SOLUTION INTRAMUSCULAR; INTRAVENOUS at 11:16

## 2021-05-27 NOTE — ROUTINE PROCESS
Procedure reviewed with patient by JOSE Titus. Opportunity to verbalize questions and concerns. Consent obtained.

## 2021-05-27 NOTE — DISCHARGE INSTRUCTIONS
KENZ[facility] CASTILLO [facility] (/SNF)  Radiology Department  [phone]    Radiologist: Dr. Leeann Brown    Date: 5/27/2021       Bone Marrow Biopsy Discharge Instructions      Go home and rest  and restrict your activity the next 24 hours. You have been given sedating medications, so do not drive or make important decisions today. Resume your previous diet and prescribed medications. You may shower in 24 hours. Do not soak or swim until the biopsy site has healed completely to minimize any risk of infection. Watch site for redness, drainage, pus, foul odor, increasing pain and fevers. Should this occur call you doctor immediatly. You may take Tylenol if allowed, as directed on the label, for pain or discomfort. Avoid Ibuprofen (Advil, Motrin etc.) and Aspirin today as they may increase your risk of bleeding. Be sure to follow up with your physician, and let him know how you are progressing and to receive your results. If you have any questions about your procedure today please call and ask to speak to a radiology nurse.        I

## 2021-05-27 NOTE — H&P
Interventional Radiology History and Physical      Patient: Abdullahi Staples. 79 y.o. male  [id number]    Consult Requested by:  Bethany Estrada*    Chief Complaint: anemia, abnormal hematology studies    History of Present Illness: 80 yo male referred to hematology from nephrology for abnormal blood tests and worsening anemia. Radiology has been consulted for image-guided bone marrow biopsy. History:  Past Medical History:   Diagnosis Date    Cancer [facility])     prostate     Family History   Problem Relation Age of Onset    Lung Disease Mother     Cancer Father      Social History     Socioeconomic History    Marital status: SINGLE     Spouse name: Not on file    Number of children: Not on file    Years of education: Not on file    Highest education level: Not on file   Occupational History    Not on file   Tobacco Use    Smoking status: Never Smoker    Smokeless tobacco: Never Used   Substance and Sexual Activity    Alcohol use: Yes     Alcohol/week: 5.0 standard drinks     Types: 6 Shots of liquor per week    Drug use: No    Sexual activity: Not on file   Other Topics Concern    Not on file   Social History Narrative    Not on file     Social Determinants of Health     Financial Resource Strain:     Difficulty of Paying Living Expenses:    Food Insecurity:     Worried About Running Out of Food in the Last Year:     [address] in the Last Year:    Transportation Needs:     Lack of Transportation (Medical):      Lack of Transportation (Non-Medical):    Physical Activity:     Days of Exercise per Week:     Minutes of Exercise per Session:    Stress:     Feeling of Stress :    Social Connections:     Frequency of Communication with Friends and Family:     Frequency of Social Gatherings with Friends and Family:     Attends Nondenominational Services:     Active Member of Clubs or Organizations:     Attends Club or Organization Meetings:     Marital Status:    Intimate Partner Violence:  Fear of Current or Ex-Partner:     Emotionally Abused:     Physically Abused:     Sexually Abused: Allergies: No Known Allergies    Current Medications:  Current Outpatient Medications   Medication Sig    loratadine (CLARITIN) 10 mg tablet Take 10 mg by mouth.  albuterol (PROAIR HFA) 90 mcg/actuation inhaler Take 1-2 Puffs by inhalation every four (4) hours as needed for Wheezing.  ferrous sulfate (IRON) 325 mg (65 mg iron) tablet Take  by mouth Daily (before breakfast).  multivitamin (ONE A DAY) tablet Take 1 Tab by mouth daily. No current facility-administered medications for this encounter. Review of Systems:  Patient denies fever, chills, cough, headache, vision changes, difficulty swallowing, shortness of breath, chest pain, abdominal pain, nausea, vomiting, changes in bladder or bowel habits, extremity weakness, numbness, tingling or swelling. The remainder of the review of systems is negative for any additional contributing elements. Physical Exam:  Blood pressure 130/80, pulse 79, temperature 98.6 °F (37 °C), resp. rate 18, SpO2 94 %. GENERAL: alert, cooperative, no distress, appears stated age,   LUNG: clear to auscultation bilaterally,   HEART: regular rate and rhythm,   ABDOMEN: soft, non-tender. Bowel sounds normal.   EXTREMITIES:  extremities normal, atraumatic, no cyanosis or edema,   SKIN: no rash or abnormalities,   NEUROLOGIC: AOx3. Cranial nerves 2-12 and sensation grossly intact. Laboratory:      Recent Labs     05/27/21  1031   HGB 11.1*   HCT 35.2*   WBC 7.2          Imaging:  XR BONE SURVEY COMP    Result Date: 5/27/2021  impression: No significant lesion and no change. Impression/Plan:  Patient has been evaluated and deemed an appropriate candidate for intravenous sedation. Based on history and presentation he is a candidate for CT-guided bone marrow biopsy. The above procedure was explained to the patient/consenting party. Benefits, risks and alternative therapies reviewed and all questions answered to his satisfaction. At this time he wishes to proceed. Please note that Dr. Frank Tristan participated in the provision of these services. We appreciate the kind consultation and the opportunity to participate in Fabian Hopkins Sr.'s care.         Kady Madden PA-C  Interventional Radiology  [facility] [place]  [facility]  [phone]  [facility] [phone]      CC: Mary Ellen Frias

## 2021-08-17 ENCOUNTER — HOSPITAL ENCOUNTER (OUTPATIENT)
Dept: CT IMAGING | Age: 71
Discharge: HOME | End: 2021-08-17
Attending: INTERNAL MEDICINE
Payer: MEDICARE

## 2021-08-17 DIAGNOSIS — R59.0 HILAR ADENOPATHY: ICD-10-CM

## 2021-08-17 PROCEDURE — 71250 CT THORAX DX C-: CPT

## 2022-04-26 ENCOUNTER — HOSPITAL ENCOUNTER (OUTPATIENT)
Dept: CT IMAGING | Age: 72
Discharge: HOME | End: 2022-04-26
Attending: NURSE PRACTITIONER
Payer: MEDICARE

## 2022-04-26 ENCOUNTER — TRANSCRIBE ORDER (OUTPATIENT)
Dept: SCHEDULING | Age: 72
End: 2022-04-26

## 2022-04-26 DIAGNOSIS — I26.99 PULMONARY EMBOLISM (HCC): ICD-10-CM

## 2022-04-26 DIAGNOSIS — I26.99 PULMONARY EMBOLISM (HCC): Primary | ICD-10-CM

## 2022-04-26 LAB — CREAT BLD-MCNC: 0.8 MG/DL (ref 0.6–1.3)

## 2022-04-26 PROCEDURE — 71275 CT ANGIOGRAPHY CHEST: CPT

## 2022-04-26 PROCEDURE — 74011000636 HC RX REV CODE- 636: Performed by: NURSE PRACTITIONER

## 2022-04-26 PROCEDURE — 82565 ASSAY OF CREATININE: CPT

## 2022-04-26 RX ADMIN — IOPAMIDOL 80 ML: 755 INJECTION, SOLUTION INTRAVENOUS at 10:11

## 2022-04-28 ENCOUNTER — HOSPITAL ENCOUNTER (INPATIENT)
Age: 72
LOS: 6 days | Discharge: HOME | DRG: 871 | End: 2022-05-04
Attending: EMERGENCY MEDICINE | Admitting: INTERNAL MEDICINE
Payer: MEDICARE

## 2022-04-28 DIAGNOSIS — R06.09 DYSPNEA ON EXERTION: ICD-10-CM

## 2022-04-28 DIAGNOSIS — R07.9 CHEST PAIN, UNSPECIFIED TYPE: ICD-10-CM

## 2022-04-28 DIAGNOSIS — R04.89 PULMONARY HEMORRHAGE: Primary | ICD-10-CM

## 2022-04-28 DIAGNOSIS — J18.9 COMMUNITY ACQUIRED PNEUMONIA, UNSPECIFIED LATERALITY: ICD-10-CM

## 2022-04-28 DIAGNOSIS — R77.8 ELEVATED TROPONIN: ICD-10-CM

## 2022-04-28 PROBLEM — A41.9 SEPSIS (HCC): Status: ACTIVE | Noted: 2022-04-28

## 2022-04-28 PROBLEM — R04.2 HEMOPTYSIS: Status: ACTIVE | Noted: 2022-04-28

## 2022-04-28 LAB
ALBUMIN SERPL-MCNC: 3.6 G/DL (ref 3.5–5)
ALBUMIN/GLOB SERPL: 1.1 {RATIO} (ref 1.1–2.2)
ALP SERPL-CCNC: 50 U/L (ref 45–117)
ALT SERPL-CCNC: 45 U/L (ref 12–78)
ANION GAP SERPL CALC-SCNC: 6 MMOL/L (ref 5–15)
AST SERPL-CCNC: 24 U/L (ref 15–37)
ATRIAL RATE: 85 BPM
BASE EXCESS BLD CALC-SCNC: 1.9 MMOL/L
BASOPHILS # BLD: 0 K/UL (ref 0–0.1)
BASOPHILS NFR BLD: 0 % (ref 0–1)
BILIRUB SERPL-MCNC: 0.6 MG/DL (ref 0.2–1)
BNP SERPL-MCNC: 94 PG/ML
BUN SERPL-MCNC: 23 MG/DL (ref 6–20)
BUN/CREAT SERPL: 19 (ref 12–20)
CA-I BLD-MCNC: 1.28 MMOL/L (ref 1.12–1.32)
CALCIUM SERPL-MCNC: 9.3 MG/DL (ref 8.5–10.1)
CALCULATED P AXIS, ECG09: 71 DEGREES
CALCULATED R AXIS, ECG10: 16 DEGREES
CALCULATED T AXIS, ECG11: 98 DEGREES
CHLORIDE BLD-SCNC: 104 MMOL/L (ref 100–108)
CHLORIDE SERPL-SCNC: 105 MMOL/L (ref 97–108)
CO2 BLD-SCNC: 28 MMOL/L (ref 19–24)
CO2 SERPL-SCNC: 27 MMOL/L (ref 21–32)
COVID-19 RAPID TEST, COVR: NOT DETECTED
CREAT SERPL-MCNC: 1.19 MG/DL (ref 0.7–1.3)
CREAT UR-MCNC: 0.9 MG/DL (ref 0.6–1.3)
DIAGNOSIS, 93000: NORMAL
DIFFERENTIAL METHOD BLD: ABNORMAL
EOSINOPHIL # BLD: 0 K/UL (ref 0–0.4)
EOSINOPHIL NFR BLD: 0 % (ref 0–7)
ERYTHROCYTE [DISTWIDTH] IN BLOOD BY AUTOMATED COUNT: 16.4 % (ref 11.5–14.5)
GLOBULIN SER CALC-MCNC: 3.3 G/DL (ref 2–4)
GLUCOSE BLD STRIP.AUTO-MCNC: 88 MG/DL (ref 74–106)
GLUCOSE SERPL-MCNC: 93 MG/DL (ref 65–100)
HCO3 BLDA-SCNC: 27 MMOL/L
HCT VFR BLD AUTO: 31.9 % (ref 36.6–50.3)
HGB BLD-MCNC: 10.5 G/DL (ref 12.1–17)
IMM GRANULOCYTES # BLD AUTO: 0.3 K/UL (ref 0–0.04)
IMM GRANULOCYTES NFR BLD AUTO: 2 % (ref 0–0.5)
LACTATE BLD-SCNC: 2.16 MMOL/L (ref 0.4–2)
LYMPHOCYTES # BLD: 1.1 K/UL (ref 0.8–3.5)
LYMPHOCYTES NFR BLD: 7 % (ref 12–49)
MCH RBC QN AUTO: 28.3 PG (ref 26–34)
MCHC RBC AUTO-ENTMCNC: 32.9 G/DL (ref 30–36.5)
MCV RBC AUTO: 86 FL (ref 80–99)
MONOCYTES # BLD: 0.8 K/UL (ref 0–1)
MONOCYTES NFR BLD: 5 % (ref 5–13)
NEUTS SEG # BLD: 13.2 K/UL (ref 1.8–8)
NEUTS SEG NFR BLD: 86 % (ref 32–75)
NRBC # BLD: 0.13 K/UL (ref 0–0.01)
NRBC BLD-RTO: 0.8 PER 100 WBC
P-R INTERVAL, ECG05: 178 MS
PCO2 BLDV: 43.1 MMHG (ref 41–51)
PH BLDV: 7.41 [PH] (ref 7.32–7.42)
PLATELET # BLD AUTO: 310 K/UL (ref 150–400)
PMV BLD AUTO: 10.1 FL (ref 8.9–12.9)
PO2 BLDV: 36 MMHG (ref 25–40)
POTASSIUM BLD-SCNC: 4.3 MMOL/L (ref 3.5–5.5)
POTASSIUM SERPL-SCNC: 4.3 MMOL/L (ref 3.5–5.1)
PROT SERPL-MCNC: 6.9 G/DL (ref 6.4–8.2)
Q-T INTERVAL, ECG07: 338 MS
QRS DURATION, ECG06: 76 MS
QTC CALCULATION (BEZET), ECG08: 402 MS
RBC # BLD AUTO: 3.71 M/UL (ref 4.1–5.7)
RBC MORPH BLD: ABNORMAL
RBC MORPH BLD: ABNORMAL
SERVICE CMNT-IMP: ABNORMAL
SODIUM BLD-SCNC: 142 MMOL/L (ref 136–145)
SODIUM SERPL-SCNC: 138 MMOL/L (ref 136–145)
SOURCE, COVRS: NORMAL
SPECIMEN SITE: ABNORMAL
TROPONIN-HIGH SENSITIVITY: 153 NG/L (ref 0–76)
TROPONIN-HIGH SENSITIVITY: 92 NG/L (ref 0–76)
VENTRICULAR RATE, ECG03: 85 BPM
WBC # BLD AUTO: 15.4 K/UL (ref 4.1–11.1)

## 2022-04-28 PROCEDURE — 85025 COMPLETE CBC W/AUTO DIFF WBC: CPT

## 2022-04-28 PROCEDURE — 0202U NFCT DS 22 TRGT SARS-COV-2: CPT

## 2022-04-28 PROCEDURE — 74011000258 HC RX REV CODE- 258: Performed by: INTERNAL MEDICINE

## 2022-04-28 PROCEDURE — 74011250636 HC RX REV CODE- 250/636: Performed by: INTERNAL MEDICINE

## 2022-04-28 PROCEDURE — 65270000046 HC RM TELEMETRY

## 2022-04-28 PROCEDURE — 84295 ASSAY OF SERUM SODIUM: CPT

## 2022-04-28 PROCEDURE — 99285 EMERGENCY DEPT VISIT HI MDM: CPT

## 2022-04-28 PROCEDURE — 83880 ASSAY OF NATRIURETIC PEPTIDE: CPT

## 2022-04-28 PROCEDURE — 84484 ASSAY OF TROPONIN QUANT: CPT

## 2022-04-28 PROCEDURE — 93005 ELECTROCARDIOGRAM TRACING: CPT

## 2022-04-28 PROCEDURE — 36415 COLL VENOUS BLD VENIPUNCTURE: CPT

## 2022-04-28 PROCEDURE — 80053 COMPREHEN METABOLIC PANEL: CPT

## 2022-04-28 PROCEDURE — 87635 SARS-COV-2 COVID-19 AMP PRB: CPT

## 2022-04-28 PROCEDURE — 87040 BLOOD CULTURE FOR BACTERIA: CPT

## 2022-04-28 RX ORDER — ONDANSETRON 2 MG/ML
4 INJECTION INTRAMUSCULAR; INTRAVENOUS
Status: DISCONTINUED | OUTPATIENT
Start: 2022-04-28 | End: 2022-05-04 | Stop reason: HOSPADM

## 2022-04-28 RX ORDER — SODIUM CHLORIDE 9 MG/ML
75 INJECTION, SOLUTION INTRAVENOUS CONTINUOUS
Status: DISCONTINUED | OUTPATIENT
Start: 2022-04-28 | End: 2022-04-30

## 2022-04-28 RX ORDER — ONDANSETRON 4 MG/1
4 TABLET, ORALLY DISINTEGRATING ORAL
Status: DISCONTINUED | OUTPATIENT
Start: 2022-04-28 | End: 2022-05-04 | Stop reason: HOSPADM

## 2022-04-28 RX ORDER — SODIUM CHLORIDE 0.9 % (FLUSH) 0.9 %
5-40 SYRINGE (ML) INJECTION AS NEEDED
Status: DISCONTINUED | OUTPATIENT
Start: 2022-04-28 | End: 2022-05-04 | Stop reason: HOSPADM

## 2022-04-28 RX ORDER — POLYETHYLENE GLYCOL 3350 17 G/17G
17 POWDER, FOR SOLUTION ORAL DAILY PRN
Status: DISCONTINUED | OUTPATIENT
Start: 2022-04-28 | End: 2022-05-04 | Stop reason: HOSPADM

## 2022-04-28 RX ORDER — SODIUM CHLORIDE 0.9 % (FLUSH) 0.9 %
5-40 SYRINGE (ML) INJECTION EVERY 8 HOURS
Status: DISCONTINUED | OUTPATIENT
Start: 2022-04-28 | End: 2022-05-04 | Stop reason: HOSPADM

## 2022-04-28 RX ORDER — ACETAMINOPHEN 650 MG/1
650 SUPPOSITORY RECTAL
Status: DISCONTINUED | OUTPATIENT
Start: 2022-04-28 | End: 2022-05-04 | Stop reason: HOSPADM

## 2022-04-28 RX ORDER — ACETAMINOPHEN 325 MG/1
650 TABLET ORAL
Status: DISCONTINUED | OUTPATIENT
Start: 2022-04-28 | End: 2022-05-04 | Stop reason: HOSPADM

## 2022-04-28 RX ADMIN — SODIUM CHLORIDE 1000 ML: 9 INJECTION, SOLUTION INTRAVENOUS at 20:35

## 2022-04-28 RX ADMIN — CEFTRIAXONE 1 G: 1 INJECTION, POWDER, FOR SOLUTION INTRAMUSCULAR; INTRAVENOUS at 19:05

## 2022-04-28 RX ADMIN — SODIUM CHLORIDE 100 ML/HR: 9 INJECTION, SOLUTION INTRAVENOUS at 21:56

## 2022-04-28 RX ADMIN — AZITHROMYCIN MONOHYDRATE 500 MG: 500 INJECTION, POWDER, LYOPHILIZED, FOR SOLUTION INTRAVENOUS at 20:33

## 2022-04-28 RX ADMIN — SODIUM CHLORIDE 75 ML/HR: 9 INJECTION, SOLUTION INTRAVENOUS at 19:05

## 2022-04-28 NOTE — PROGRESS NOTES
TRANSFER - IN REPORT:    Verbal report received from [facility], RN(name) on [employer].  being received from ER(unit) for routine progression of care      Report consisted of patients Situation, Background, Assessment and   Recommendations(SBAR). Information from the following report(s) SBAR was reviewed with the receiving nurse. Opportunity for questions and clarification was provided. Assessment completed upon patients arrival to unit and care assumed. Clarified with RN that paired blood cultures were drawn. It is not showing in chart that blood cultures were drawn. Nurse [facility] confirmed that blood cultures were drawn.

## 2022-04-28 NOTE — ED NOTES
TRANSITION OF CARE - SBAR OUT    Patient is being transferred to [address], Room# 2209. Report GIVEN TO Rashid Hardy RN on [address]. for routine progression of care. Report is consisted of the following information SBAR, ED Summary, Intake/Output, MAR, Recent Results and Cardiac Rhythm Patient assessment, . Patient transferred to receiving unit by: Transport staff (RN or [facility] Name)     Called outstanding consults: No    Collected routine labs: No      All current orders reviewed with accepting nurse: Yes    The following personal items will be sent with the patient during transfer to the floor:   All valuables:                          CARDIAC MONITORING ORDERED: Yes     The following CURRENT information were reported to the receiving RN:    CODE STATUS: Full Code    NIH SCORE:    AXEL SCREENING:      NEURO ASSESSMENT:        RESTRAINTS IN USE: No      IS DOCUMENTATION COMPLETE: Yes      Vital Signs  Level of Consciousness: Alert (0) (04/28/22 1816)  Temp: 97.8 °F (36.6 °C) (04/28/22 1519)  Temp Source: Oral (04/28/22 1519)  Pulse (Heart Rate): 82 (04/28/22 1915)  Cardiac Rhythm: Sinus Rhythm (04/28/22 1816)  Resp Rate: 16 (04/28/22 1915)  BP: (!) 148/81 (04/28/22 1845)  MAP (Monitor): 99 (04/28/22 1845)  MAP (Calculated): 103 (04/28/22 1845)  MEWS Score: 1 (04/28/22 1519)  Pain 1  Pain Scale 1: Numeric (0 - 10) (04/28/22 1519)  Pain Intensity 1: 0 (04/28/22 1519)  Patient Stated Pain Goal: 0 (04/28/22 1519)      OXYGEN: Oxygen Therapy  O2 Device: Nasal cannula (04/28/22 1816)  O2 Flow Rate (L/min): 4 l/min (04/28/22 1816)    YOSELIN FALL RISK: Cleave Echols Fall Risk  Mobility: Ambulates without gait disturbance (04/28/22 1803)  Mentation: Alert, oriented x 3 (04/28/22 1803)  Medication: No anticonvulsants, sedatives(tranquilizers), psychotropics or hypnotics, hypoglycemics, narcotics, sleep aids, antihypertensives, laxatives, or diuretics (04/28/22 1803)  Elimination: Independent in elimination (04/28/22 1803)  Prior Fall History: No (04/28/22 1803)  Total Score: 0 (04/28/22 1803)  Standard Fall Precautions: Yes (04/28/22 1803)      WOUNDS: No      URINARY CATHETER: voiding    LINE ACCESS:   Peripheral IV 04/28/22 Right Antecubital (Active)   Site Assessment Clean, dry, & intact 04/28/22 1803   Phlebitis Assessment 0 04/28/22 1803   Infiltration Assessment 0 04/28/22 1803   Dressing Status Clean, dry, & intact 04/28/22 1803   Hub Color/Line Status Pink 04/28/22 1803   Action Taken Blood drawn 04/28/22 1803        Opportunity for questions and clarification were provided.   Salina Maldonado RN

## 2022-04-28 NOTE — Clinical Note
TRANSFER - OUT REPORT:     Verbal report given to: Sendy RN. Report consisted of patient's Situation, Background, Assessment and   Recommendations(SBAR). Opportunity for questions and clarification was provided. Patient transported with a Registered Nurse. Patient transported to: [facility].

## 2022-04-28 NOTE — ED PROVIDER NOTES
EMERGENCY DEPARTMENT HISTORY AND PHYSICAL EXAM      Date: 4/28/2022  Patient Name: Doreen Pereyra. History of Presenting Illness     Chief Complaint   Patient presents with    Shortness of Breath     dyspnea on minimal exertion. pt has been coughing up blood x 4 years, but he is not usually short of breath. He states his spo2 level was also between 88-90% this morning       History Provided By: Patient    HPI: Doreen Pereyra., 70 y.o. male  presents to the ED with cc of shortness of breath and coughing up blood. Patient states that he has been having shortness of breath with exertion. It occurs even with minimal exertion. He states that he has been feeling ill for about 1 month. He states he has been coughing up blood which is been dahlia blood. He checks his oxygen saturations at home and states they were around 88 to 90% this morning. Denies chest pain. Currently has been on the antibiotic and taking prednisone. He sees pulmonary specialist Dr. Miya Morley. He had a CTA of the chest performed 3 days ago that did not show any pulmonary embolism but did showed multifocal bilateral pneumonia. Past History     Past Medical History:  Past Medical History:   Diagnosis Date    Cancer [facility])     prostate       Past Surgical History:  Past Surgical History:   Procedure Laterality Date    HX ORTHOPAEDIC  1999    spinal fusion       Medications:  No current facility-administered medications on file prior to encounter. Current Outpatient Medications on File Prior to Encounter   Medication Sig Dispense Refill    loratadine (CLARITIN) 10 mg tablet Take 10 mg by mouth.  albuterol (PROAIR HFA) 90 mcg/actuation inhaler Take 1-2 Puffs by inhalation every four (4) hours as needed for Wheezing. 1 Inhaler 0    ferrous sulfate (IRON) 325 mg (65 mg iron) tablet Take  by mouth Daily (before breakfast).  multivitamin (ONE A DAY) tablet Take 1 Tab by mouth daily.            Family History:  Family History Problem Relation Age of Onset    Lung Disease Mother     Cancer Father        Social History:  Social History     Tobacco Use    Smoking status: Never Smoker    Smokeless tobacco: Never Used   Substance Use Topics    Alcohol use: Yes     Alcohol/week: 5.0 standard drinks     Types: 6 Shots of liquor per week    Drug use: No       Allergies:  No Known Allergies    All the above components of the past  history are auto-populated from the electronic record. They have been reviewed and the patient has been interviewed for any pertinent past history that pertains to the patient's chief complaint and reason for visit. Not all pre-populated components may be accurate at the time this note was generated. Review of Systems   Review of Systems   Constitutional: Negative for chills and fever. HENT: Negative for congestion, ear pain, rhinorrhea, sore throat and trouble swallowing. Eyes: Negative for visual disturbance. Respiratory: Positive for cough and shortness of breath. Negative for chest tightness. Hemoptysis   Cardiovascular: Negative for chest pain, palpitations and leg swelling. Gastrointestinal: Negative for abdominal pain, blood in stool, constipation, diarrhea, nausea and vomiting. Genitourinary: Negative for decreased urine volume, difficulty urinating, dysuria and frequency. Musculoskeletal: Negative for back pain and neck pain. Skin: Negative for color change and rash. Neurological: Negative for dizziness, weakness, light-headedness and headaches. Physical Exam   Physical Exam  Vitals and nursing note reviewed. Constitutional:       General: He is not in acute distress. Appearance: He is well-developed. He is not ill-appearing. HENT:      Head: Normocephalic. Eyes:      Conjunctiva/sclera: Conjunctivae normal.   Cardiovascular:      Rate and Rhythm: Normal rate and regular rhythm.    Pulmonary:      Effort: Pulmonary effort is normal. No accessory muscle usage or respiratory distress. Breath sounds: Wheezing present. Abdominal:      General: There is no distension. Musculoskeletal:      Cervical back: Normal range of motion. Skin:     General: Skin is warm and dry. Neurological:      Mental Status: He is alert and oriented to person, place, and time. Diagnostic Study Results     Labs -     Recent Results (from the past 24 hour(s))   CBC WITH AUTOMATED DIFF    Collection Time: 04/28/22  3:30 PM   Result Value Ref Range    WBC 15.4 (H) 4.1 - 11.1 K/uL    RBC 3.71 (L) 4.10 - 5.70 M/uL    HGB 10.5 (L) 12.1 - 17.0 g/dL    HCT 31.9 (L) 36.6 - 50.3 %    MCV 86.0 80.0 - 99.0 FL    MCH 28.3 26.0 - 34.0 PG    MCHC 32.9 30.0 - 36.5 g/dL    RDW 16.4 (H) 11.5 - 14.5 %    PLATELET 296 319 - 513 K/uL    MPV 10.1 8.9 - 12.9 FL    NRBC 0.8 (H) 0  WBC    ABSOLUTE NRBC 0.13 (H) 0.00 - 0.01 K/uL    NEUTROPHILS 86 (H) 32 - 75 %    LYMPHOCYTES 7 (L) 12 - 49 %    MONOCYTES 5 5 - 13 %    EOSINOPHILS 0 0 - 7 %    BASOPHILS 0 0 - 1 %    IMMATURE GRANULOCYTES 2 (H) 0.0 - 0.5 %    ABS. NEUTROPHILS 13.2 (H) 1.8 - 8.0 K/UL    ABS. LYMPHOCYTES 1.1 0.8 - 3.5 K/UL    ABS. MONOCYTES 0.8 0.0 - 1.0 K/UL    ABS. EOSINOPHILS 0.0 0.0 - 0.4 K/UL    ABS. BASOPHILS 0.0 0.0 - 0.1 K/UL    ABS. IMM.  GRANS. 0.3 (H) 0.00 - 0.04 K/UL    DF SMEAR SCANNED      RBC COMMENTS ANISOCYTOSIS  1+        RBC COMMENTS OVALOCYTES  PRESENT       METABOLIC PANEL, COMPREHENSIVE    Collection Time: 04/28/22  3:30 PM   Result Value Ref Range    Sodium 138 136 - 145 mmol/L    Potassium 4.3 3.5 - 5.1 mmol/L    Chloride 105 97 - 108 mmol/L    CO2 27 21 - 32 mmol/L    Anion gap 6 5 - 15 mmol/L    Glucose 93 65 - 100 mg/dL    BUN 23 (H) 6 - 20 MG/DL    Creatinine 1.19 0.70 - 1.30 MG/DL    BUN/Creatinine ratio 19 12 - 20      GFR est AA >60 >60 ml/min/1.73m2    GFR est non-AA >60 >60 ml/min/1.73m2    Calcium 9.3 8.5 - 10.1 MG/DL    Bilirubin, total 0.6 0.2 - 1.0 MG/DL    ALT (SGPT) 45 12 - 78 U/L    AST (SGOT) 24 15 - 37 U/L    Alk. phosphatase 50 45 - 117 U/L    Protein, total 6.9 6.4 - 8.2 g/dL    Albumin 3.6 3.5 - 5.0 g/dL    Globulin 3.3 2.0 - 4.0 g/dL    A-G Ratio 1.1 1.1 - 2.2     TROPONIN-HIGH SENSITIVITY    Collection Time: 04/28/22  3:30 PM   Result Value Ref Range    Troponin-High Sensitivity 92 (HH) 0 - 76 ng/L   NT-PRO BNP    Collection Time: 04/28/22  3:30 PM   Result Value Ref Range    NT pro-BNP 94 <125 PG/ML   EKG, 12 LEAD, INITIAL    Collection Time: 04/28/22  3:41 PM   Result Value Ref Range    Ventricular Rate 85 BPM    Atrial Rate 85 BPM    P-R Interval 178 ms    QRS Duration 76 ms    Q-T Interval 338 ms    QTC Calculation (Bezet) 402 ms    Calculated P Axis 71 degrees    Calculated R Axis 16 degrees    Calculated T Axis 98 degrees    Diagnosis       Normal sinus rhythm  Nonspecific ST abnormality  Confirmed by Befacundoord Herve ([id number]) on 4/28/2022 5:26:40 PM     TROPONIN-HIGH SENSITIVITY    Collection Time: 04/28/22  5:56 PM   Result Value Ref Range    Troponin-High Sensitivity 153 (HH) 0 - 76 ng/L       Radiologic Studies -   No orders to display     CT Results  (Last 48 hours)    None        CXR Results  (Last 48 hours)    None            Medical Decision Making     I reviewed the vital signs, available nursing notes, past medical history, past surgical history, family history and social history. Vital Signs-I have reviewed the vital signs that have been made available during the patient's emergency department visit. The vital signs auto-populated below are obtained mostly by electronic means through monitoring devices that have been downloaded into the patient's chart by the nursing staff. Some vital signs are not downloaded into the chart until after the patient has been discharged and this note has been completed, therefore some vital signs may not be available to the physician for review prior to patient's discharge or admission.   The physician has reviewed the patient's triage vital signs, monitored the electronic monitoring devices remotely for any significant vital sign abnormalities, and have reviewed vital signs prior to discharge. Some vital signs reviewed at bedside or remotely utilizing electronic monitoring devices may be different than the vital signs downloaded into the electronic medical record. Some vital signs may be erroneous and inaccurate since they are obtained by electronic monitoring devices, and not all vital signs are verified for accuracy by nursing staff prior to downloading into the patient's chart. Patient Vitals for the past 24 hrs:   Temp Pulse Resp BP SpO2   04/28/22 1816 -- 81 23 134/74 94 %   04/28/22 1803 -- -- -- -- 91 %   04/28/22 1800 -- 83 27 135/78 --   04/28/22 1723 -- 87 16 (!) 172/86 93 %   04/28/22 1519 97.8 °F (36.6 °C) 95 16 136/69 95 %         Records Reviewed: Nursing notes for today's visit have been reviewed. I have also reviewed most recent medical records pertinent to today's complaints, if available in our medical record system. I have also reviewed all labs and imaging results from previous results in comparison to results obtained today. If an EKG was obtained today, it has been compared to previous EKGs, if available. If arriving via EMS, the EMS report has been reviewed if made available to us within the patient's time in the emergency department. Provider Notes (Medical Decision Making):   Patient presents with respiratory complaints that have been present for several weeks up to a month. He is afebrile on arrival.  He is mildly hypoxic. He is not tachycardic or hypotensive. CTA imaging was reviewed from earlier in the week which has been read as bilateral airspace disease. I do not suspect pneumonia as the patient has had prolonged symptoms for several weeks. He has been coughing up dahlia blood. I suspect the findings are consistent with pulmonary hemorrhage. He has no signs or symptoms of infections.   He does have an elevated white blood cell count today however he has just been recently started on prednisone. Radiology is reading his CTA is possible COVID-pneumonia however this is not a radiological diagnosis, nonetheless we will order a rapid COVID screening test.  He is not anticoagulated. I do not suspect that this patient has sepsis. I do not suspect that this is pneumonia. He does have a slightly elevated troponin. His proBNP is normal.  Clinical picture could be suggestive of congestive heart failure showing airspace disease on the imaging. Recommend hospitalization considering that he is oxygen requiring today. Hospitalist would like to start him on antibiotics and do a septic work-up. Will order blood cultures and lactic acid at their request and start him on antibiotics. ED Course:   Initial assessment performed. The patients presenting problems have been discussed, and they are in agreement with the care plan formulated and outlined with them. I have encouraged them to ask questions as they arise throughout their visit.          Orders Placed This Encounter    COVID-19 RAPID TEST    CULTURE, BLOOD, PAIRED    CBC WITH AUTOMATED DIFF    METABOLIC PANEL, COMPREHENSIVE    TROPONIN-HIGH SENSITIVITY    NT-PRO BNP    TROPONIN-HIGH SENSITIVITY    LACTIC ACID, PLASMA    LACTIC ACID, PLASMA    METABOLIC PANEL, BASIC    CBC W/O DIFF    PROCALCITONIN    TROPONIN-HIGH SENSITIVITY    DALILA, DIRECT, W/REFLEX    PROTHROMBIN TIME + INR    ADULT DIET Regular    SOB PANEL TRACKING (DO NOT DESELECT)    OXYGEN CANNULA (To maintain O2 sat greater than 92%) Consult MD if Hx. of COPD    PULSE OXIMETRY SPOT CHECK    PULSE OXIMETRY CONTINUOUS (if clinically indicated)    CARDIAC MONITOR (if Clinically indicated)    CARDIAC MONITORING    VITAL SIGNS    ACTIVITY AS TOLERATED W/ASSIST    APPLY/MAINTAIN SEQUENTIAL COMPRESSION DEVICE    EKG NOTEWRITER(ASAP ONLY)    FULL CODE    EKG, 12 LEAD, INITIAL    SALINE LOCK IV ONE TIME STAT    DISCONTD: cefTRIAXone (ROCEPHIN) 2 g in 0.9% sodium chloride (MBP/ADV) 50 mL MBP    DISCONTD: azithromycin (ZITHROMAX) 500 mg in 0.9% sodium chloride 250 mL (VIAL-MATE)    sodium chloride (NS) flush 5-40 mL    sodium chloride (NS) flush 5-40 mL    OR Linked Order Group     acetaminophen (TYLENOL) tablet 650 mg     acetaminophen (TYLENOL) suppository 650 mg    polyethylene glycol (MIRALAX) packet 17 g    OR Linked Order Group     ondansetron (ZOFRAN ODT) tablet 4 mg     ondansetron (ZOFRAN) injection 4 mg    0.9% sodium chloride infusion    cefTRIAXone (ROCEPHIN) 1 g in 0.9% sodium chloride (MBP/ADV) 50 mL MBP    azithromycin (ZITHROMAX) 500 mg in 0.9% sodium chloride 250 mL (VIAL-MATE)    IP CONSULT TO PULMONOLOGY    INITIAL PHYSICIAN ORDER: INPATIENT Telemetry; Yes; 3. Patient receiving treatment that can only be provided in an inpatient setting (further clarification in H&P documentation)       EKG    Date/Time: 4/28/2022 3:41 PM  Performed by: Fransico Alvares MD  Authorized by: Fransico Alvares MD     ECG reviewed by ED Physician in the absence of a cardiologist: yes    Rate:     ECG rate:  85    ECG rate assessment: normal    Rhythm:     Rhythm: sinus rhythm    Ectopy:     Ectopy: none    QRS:     QRS axis:  Normal  Conduction:     Conduction: normal    ST segments:     ST segments:  Normal  T waves:     T waves: non-specific            Critical Care Time:   0    Disposition:  Admit        Diagnosis     Clinical Impression:   1. Pulmonary hemorrhage    2.  Dyspnea on exertion

## 2022-04-28 NOTE — ED NOTES
Dr. Paulino Tilley was called via telephone and notified of patient POC lactate of 2.16. New orders to be placed in patient MAR. Patient is now sitting up at bedside eating dinner without any difficulties.

## 2022-04-29 ENCOUNTER — APPOINTMENT (OUTPATIENT)
Dept: NON INVASIVE DIAGNOSTICS | Age: 72
DRG: 871 | End: 2022-04-29
Attending: INTERNAL MEDICINE
Payer: MEDICARE

## 2022-04-29 ENCOUNTER — APPOINTMENT (OUTPATIENT)
Dept: NON INVASIVE DIAGNOSTICS | Age: 72
DRG: 871 | End: 2022-04-29
Attending: NURSE PRACTITIONER
Payer: MEDICARE

## 2022-04-29 ENCOUNTER — APPOINTMENT (OUTPATIENT)
Dept: NUCLEAR MEDICINE | Age: 72
DRG: 871 | End: 2022-04-29
Attending: NURSE PRACTITIONER
Payer: MEDICARE

## 2022-04-29 PROBLEM — R77.8 ELEVATED TROPONIN: Status: ACTIVE | Noted: 2022-04-29

## 2022-04-29 PROBLEM — R79.89 ELEVATED TROPONIN: Status: ACTIVE | Noted: 2022-04-29

## 2022-04-29 LAB
ANION GAP SERPL CALC-SCNC: 5 MMOL/L (ref 5–15)
B PERT DNA SPEC QL NAA+PROBE: NOT DETECTED
BORDETELLA PARAPERTUSSIS PCR, BORPAR: NOT DETECTED
BUN SERPL-MCNC: 19 MG/DL (ref 6–20)
BUN/CREAT SERPL: 23 (ref 12–20)
C PNEUM DNA SPEC QL NAA+PROBE: NOT DETECTED
CALCIUM SERPL-MCNC: 8.4 MG/DL (ref 8.5–10.1)
CHLORIDE SERPL-SCNC: 110 MMOL/L (ref 97–108)
CHOLEST SERPL-MCNC: 161 MG/DL
CO2 SERPL-SCNC: 27 MMOL/L (ref 21–32)
CREAT SERPL-MCNC: 0.82 MG/DL (ref 0.7–1.3)
ECHO AO ROOT DIAM: 3.6 CM
ECHO AO ROOT INDEX: 1.62 CM/M2
ECHO AV AREA PEAK VELOCITY: 2.5 CM2
ECHO AV AREA PEAK VELOCITY: 2.5 CM2
ECHO AV AREA PEAK VELOCITY: 2.6 CM2
ECHO AV AREA PEAK VELOCITY: 2.6 CM2
ECHO AV AREA VTI: 2.7 CM2
ECHO AV AREA/BSA VTI: 1.2 CM2/M2
ECHO AV CUSP MM: 1.6 CM
ECHO AV MEAN GRADIENT: 5 MMHG
ECHO AV MEAN VELOCITY: 1.1 M/S
ECHO AV PEAK GRADIENT: 9 MMHG
ECHO AV PEAK GRADIENT: 9 MMHG
ECHO AV PEAK VELOCITY: 1.5 M/S
ECHO AV PEAK VELOCITY: 1.5 M/S
ECHO AV VTI: 24.1 CM
ECHO LA DIAMETER INDEX: 1.8 CM/M2
ECHO LA DIAMETER: 4 CM
ECHO LA TO AORTIC ROOT RATIO: 1.11
ECHO LA VOL 2C: 64 ML (ref 18–58)
ECHO LA VOL 4C: 36 ML (ref 18–58)
ECHO LA VOLUME AREA LENGTH: 64 ML
ECHO LA VOLUME INDEX A2C: 29 ML/M2 (ref 16–34)
ECHO LA VOLUME INDEX A4C: 16 ML/M2 (ref 16–34)
ECHO LA VOLUME INDEX AREA LENGTH: 29 ML/M2 (ref 16–34)
ECHO LV E' LATERAL VELOCITY: 8 CM/S
ECHO LV E' SEPTAL VELOCITY: 4 CM/S
ECHO LV FRACTIONAL SHORTENING: 24 % (ref 28–44)
ECHO LV INTERNAL DIMENSION DIASTOLE INDEX: 1.85 CM/M2
ECHO LV INTERNAL DIMENSION DIASTOLIC: 4.1 CM (ref 4.2–5.9)
ECHO LV INTERNAL DIMENSION SYSTOLIC INDEX: 1.4 CM/M2
ECHO LV INTERNAL DIMENSION SYSTOLIC: 3.1 CM
ECHO LV IVSD: 1.8 CM (ref 0.6–1)
ECHO LV MASS 2D: 323.1 G (ref 88–224)
ECHO LV MASS INDEX 2D: 145.5 G/M2 (ref 49–115)
ECHO LV POSTERIOR WALL DIASTOLIC: 1.8 CM (ref 0.6–1)
ECHO LV RELATIVE WALL THICKNESS RATIO: 0.88
ECHO LVOT AREA: 3.1 CM2
ECHO LVOT AV VTI INDEX: 0.83
ECHO LVOT DIAM: 2 CM
ECHO LVOT MEAN GRADIENT: 3 MMHG
ECHO LVOT PEAK GRADIENT: 6 MMHG
ECHO LVOT PEAK GRADIENT: 6 MMHG
ECHO LVOT PEAK VELOCITY: 1.2 M/S
ECHO LVOT PEAK VELOCITY: 1.2 M/S
ECHO LVOT STROKE VOLUME INDEX: 28.4 ML/M2
ECHO LVOT SV: 63.1 ML
ECHO LVOT VTI: 20.1 CM
ECHO MV A VELOCITY: 0.84 M/S
ECHO MV E VELOCITY: 0.48 M/S
ECHO MV E/A RATIO: 0.57
ECHO MV E/E' LATERAL: 6
ECHO MV E/E' RATIO (AVERAGED): 9
ECHO MV E/E' SEPTAL: 12
ECHO PV MAX VELOCITY: 1 M/S
ECHO PV PEAK GRADIENT: 4 MMHG
ECHO RV FREE WALL PEAK S': 15 CM/S
ECHO RV INTERNAL DIMENSION: 3.3 CM
ECHO RV TAPSE: 2.5 CM (ref 1.7–?)
ECHO RVOT PEAK GRADIENT: 3 MMHG
ECHO RVOT PEAK VELOCITY: 0.9 M/S
ERYTHROCYTE [DISTWIDTH] IN BLOOD BY AUTOMATED COUNT: 16.5 % (ref 11.5–14.5)
EST. AVERAGE GLUCOSE BLD GHB EST-MCNC: 123 MG/DL
FLUAV H1 2009 PAND RNA SPEC QL NAA+PROBE: NOT DETECTED
FLUAV H1 RNA SPEC QL NAA+PROBE: NOT DETECTED
FLUAV H3 RNA SPEC QL NAA+PROBE: NOT DETECTED
FLUAV SUBTYP SPEC NAA+PROBE: NOT DETECTED
FLUBV RNA SPEC QL NAA+PROBE: NOT DETECTED
GLUCOSE BLD STRIP.AUTO-MCNC: 82 MG/DL (ref 65–117)
GLUCOSE SERPL-MCNC: 78 MG/DL (ref 65–100)
HADV DNA SPEC QL NAA+PROBE: NOT DETECTED
HBA1C MFR BLD: 5.9 % (ref 4–5.6)
HCOV 229E RNA SPEC QL NAA+PROBE: NOT DETECTED
HCOV HKU1 RNA SPEC QL NAA+PROBE: NOT DETECTED
HCOV NL63 RNA SPEC QL NAA+PROBE: NOT DETECTED
HCOV OC43 RNA SPEC QL NAA+PROBE: NOT DETECTED
HCT VFR BLD AUTO: 29 % (ref 36.6–50.3)
HDLC SERPL-MCNC: 70 MG/DL
HDLC SERPL: 2.3 {RATIO} (ref 0–5)
HGB BLD-MCNC: 9.1 G/DL (ref 12.1–17)
HMPV RNA SPEC QL NAA+PROBE: NOT DETECTED
HPIV1 RNA SPEC QL NAA+PROBE: NOT DETECTED
HPIV2 RNA SPEC QL NAA+PROBE: NOT DETECTED
HPIV3 RNA SPEC QL NAA+PROBE: NOT DETECTED
HPIV4 RNA SPEC QL NAA+PROBE: NOT DETECTED
INR PPP: 1.1 (ref 0.9–1.1)
LACTATE SERPL-SCNC: 1.1 MMOL/L (ref 0.4–2)
LDLC SERPL CALC-MCNC: 75.8 MG/DL (ref 0–100)
M PNEUMO DNA SPEC QL NAA+PROBE: NOT DETECTED
MCH RBC QN AUTO: 27.6 PG (ref 26–34)
MCHC RBC AUTO-ENTMCNC: 31.4 G/DL (ref 30–36.5)
MCV RBC AUTO: 87.9 FL (ref 80–99)
NRBC # BLD: 0.06 K/UL (ref 0–0.01)
NRBC BLD-RTO: 0.4 PER 100 WBC
PLATELET # BLD AUTO: 249 K/UL (ref 150–400)
PMV BLD AUTO: 9.9 FL (ref 8.9–12.9)
POTASSIUM SERPL-SCNC: 4.2 MMOL/L (ref 3.5–5.1)
PROCALCITONIN SERPL-MCNC: 0.07 NG/ML
PROTHROMBIN TIME: 11.9 SEC (ref 9–11.1)
RBC # BLD AUTO: 3.3 M/UL (ref 4.1–5.7)
RSV RNA SPEC QL NAA+PROBE: NOT DETECTED
RV+EV RNA SPEC QL NAA+PROBE: NOT DETECTED
SARS-COV-2 PCR, COVPCR: NOT DETECTED
SERVICE CMNT-IMP: NORMAL
SODIUM SERPL-SCNC: 142 MMOL/L (ref 136–145)
STRESS BASELINE DIAS BP: 89 MMHG
STRESS BASELINE HR: 82 BPM
STRESS BASELINE ST DEPRESSION: 0 MM
STRESS BASELINE SYS BP: 140 MMHG
STRESS ESTIMATED WORKLOAD: 1 METS
STRESS EXERCISE DUR MIN: 1 MIN
STRESS EXERCISE DUR SEC: 5 SEC
STRESS O2 SAT PEAK: 95 %
STRESS O2 SAT REST: 92 %
STRESS PEAK DIAS BP: 82 MMHG
STRESS PEAK SYS BP: 150 MMHG
STRESS PERCENT HR ACHIEVED: 70 %
STRESS POST PEAK HR: 104 BPM
STRESS RATE PRESSURE PRODUCT: NORMAL BPM*MMHG
STRESS TARGET HR: 149 BPM
TRIGL SERPL-MCNC: 76 MG/DL (ref ?–150)
TROPONIN-HIGH SENSITIVITY: 95 NG/L (ref 0–76)
VLDLC SERPL CALC-MCNC: 15.2 MG/DL
WBC # BLD AUTO: 14.1 K/UL (ref 4.1–11.1)

## 2022-04-29 PROCEDURE — 65270000046 HC RM TELEMETRY

## 2022-04-29 PROCEDURE — 93306 TTE W/DOPPLER COMPLETE: CPT | Performed by: INTERNAL MEDICINE

## 2022-04-29 PROCEDURE — A9500 TC99M SESTAMIBI: HCPCS

## 2022-04-29 PROCEDURE — 83605 ASSAY OF LACTIC ACID: CPT

## 2022-04-29 PROCEDURE — 85610 PROTHROMBIN TIME: CPT

## 2022-04-29 PROCEDURE — 80048 BASIC METABOLIC PNL TOTAL CA: CPT

## 2022-04-29 PROCEDURE — 82962 GLUCOSE BLOOD TEST: CPT

## 2022-04-29 PROCEDURE — 74011250636 HC RX REV CODE- 250/636: Performed by: NURSE PRACTITIONER

## 2022-04-29 PROCEDURE — 99222 1ST HOSP IP/OBS MODERATE 55: CPT | Performed by: INTERNAL MEDICINE

## 2022-04-29 PROCEDURE — 86038 ANTINUCLEAR ANTIBODIES: CPT

## 2022-04-29 PROCEDURE — 77010033678 HC OXYGEN DAILY

## 2022-04-29 PROCEDURE — 74011000258 HC RX REV CODE- 258: Performed by: INTERNAL MEDICINE

## 2022-04-29 PROCEDURE — 93017 CV STRESS TEST TRACING ONLY: CPT

## 2022-04-29 PROCEDURE — 74011000250 HC RX REV CODE- 250: Performed by: NURSE PRACTITIONER

## 2022-04-29 PROCEDURE — 80061 LIPID PANEL: CPT

## 2022-04-29 PROCEDURE — 74011250637 HC RX REV CODE- 250/637: Performed by: NURSE PRACTITIONER

## 2022-04-29 PROCEDURE — 84484 ASSAY OF TROPONIN QUANT: CPT

## 2022-04-29 PROCEDURE — 83036 HEMOGLOBIN GLYCOSYLATED A1C: CPT

## 2022-04-29 PROCEDURE — 84145 PROCALCITONIN (PCT): CPT

## 2022-04-29 PROCEDURE — 74011250636 HC RX REV CODE- 250/636: Performed by: INTERNAL MEDICINE

## 2022-04-29 PROCEDURE — 74011000250 HC RX REV CODE- 250: Performed by: INTERNAL MEDICINE

## 2022-04-29 PROCEDURE — 85027 COMPLETE CBC AUTOMATED: CPT

## 2022-04-29 PROCEDURE — C8929 TTE W OR WO FOL WCON,DOPPLER: HCPCS

## 2022-04-29 PROCEDURE — 36415 COLL VENOUS BLD VENIPUNCTURE: CPT

## 2022-04-29 RX ORDER — AMLODIPINE BESYLATE AND BENAZEPRIL HYDROCHLORIDE 10; 40 MG/1; MG/1
1 CAPSULE ORAL DAILY
COMMUNITY

## 2022-04-29 RX ORDER — ATORVASTATIN CALCIUM 40 MG/1
40 TABLET, FILM COATED ORAL
Status: DISCONTINUED | OUTPATIENT
Start: 2022-04-29 | End: 2022-05-04 | Stop reason: HOSPADM

## 2022-04-29 RX ORDER — METOPROLOL TARTRATE 25 MG/1
25 TABLET, FILM COATED ORAL EVERY 12 HOURS
Status: DISCONTINUED | OUTPATIENT
Start: 2022-04-29 | End: 2022-05-04

## 2022-04-29 RX ORDER — SODIUM CHLORIDE 0.9 % (FLUSH) 0.9 %
10 SYRINGE (ML) INJECTION AS NEEDED
Status: DISCONTINUED | OUTPATIENT
Start: 2022-04-29 | End: 2022-04-30 | Stop reason: SDUPTHER

## 2022-04-29 RX ORDER — TETRAKIS(2-METHOXYISOBUTYLISOCYANIDE)COPPER(I) TETRAFLUOROBORATE 1 MG/ML
10.6 INJECTION, POWDER, LYOPHILIZED, FOR SOLUTION INTRAVENOUS
Status: COMPLETED | OUTPATIENT
Start: 2022-04-29 | End: 2022-04-29

## 2022-04-29 RX ORDER — TETRAKIS(2-METHOXYISOBUTYLISOCYANIDE)COPPER(I) TETRAFLUOROBORATE 1 MG/ML
31.6 INJECTION, POWDER, LYOPHILIZED, FOR SOLUTION INTRAVENOUS
Status: COMPLETED | OUTPATIENT
Start: 2022-04-29 | End: 2022-04-29

## 2022-04-29 RX ADMIN — SODIUM CHLORIDE, PRESERVATIVE FREE 10 ML: 5 INJECTION INTRAVENOUS at 13:55

## 2022-04-29 RX ADMIN — ATORVASTATIN CALCIUM 40 MG: 40 TABLET, FILM COATED ORAL at 21:38

## 2022-04-29 RX ADMIN — TETRAKIS(2-METHOXYISOBUTYLISOCYANIDE)COPPER(I) TETRAFLUOROBORATE 31.6 MILLICURIE: 1 INJECTION, POWDER, LYOPHILIZED, FOR SOLUTION INTRAVENOUS at 13:50

## 2022-04-29 RX ADMIN — SODIUM CHLORIDE, PRESERVATIVE FREE 10 ML: 5 INJECTION INTRAVENOUS at 21:38

## 2022-04-29 RX ADMIN — CEFTRIAXONE 1 G: 1 INJECTION, POWDER, FOR SOLUTION INTRAMUSCULAR; INTRAVENOUS at 18:26

## 2022-04-29 RX ADMIN — SODIUM CHLORIDE, PRESERVATIVE FREE 10 ML: 5 INJECTION INTRAVENOUS at 06:22

## 2022-04-29 RX ADMIN — TETRAKIS(2-METHOXYISOBUTYLISOCYANIDE)COPPER(I) TETRAFLUOROBORATE 10.6 MILLICURIE: 1 INJECTION, POWDER, LYOPHILIZED, FOR SOLUTION INTRAVENOUS at 13:00

## 2022-04-29 RX ADMIN — METOPROLOL TARTRATE 25 MG: 25 TABLET ORAL at 17:09

## 2022-04-29 RX ADMIN — REGADENOSON 0.4 MG: 0.08 INJECTION, SOLUTION INTRAVENOUS at 13:55

## 2022-04-29 RX ADMIN — SODIUM CHLORIDE, PRESERVATIVE FREE 10 ML: 5 INJECTION INTRAVENOUS at 17:11

## 2022-04-29 RX ADMIN — AZITHROMYCIN MONOHYDRATE 500 MG: 500 INJECTION, POWDER, LYOPHILIZED, FOR SOLUTION INTRAVENOUS at 18:59

## 2022-04-29 NOTE — H&P
Hospitalist Admission Note    NAME: Jose L Ashby   :  1950   MRN:  [id number]     Date/Time:  2022 9:30 PM    Patient PCP: Lito Bravo MD  ________________________________________________________________________    My assessment of this patient's clinical condition and my plan of care is as follows. Assessment / Plan:  community-acquired pneumonia  Severe sepsis  Hemoptysis chronic  -Reports hemoptysis since at least 1 year and has seen Dr. Sacha Clark on outpatient basis  -CT is now showing evidence of bilateral patchy pneumonia. Lactic is elevated at 2.16 and is tachycardic and has leukocytosis  -Start empiric ceftriaxone, Zithromax. Blood cultures to be sent in the ED. We will give 1 L IV fluid bolus. Start IV fluids with normal saline. Recheck lactic acid in about 6 hours.  -We will consult pulmonary for chronic hemoptysis. Rule out vasculitis. Check DALILA level. We will wait for recommendations from Dr. Sacha Clark.  -Check CBC and BMP in a.m. tomorrow  -Check respiratory panel with COVID PCR. Rapid SARS-CoV-2 negative    Troponin elevation likely type II non-STEMI from severe sepsis  -He does not report any chest pain. Troponins are elevated and it is currently 153. Chest x-ray shows normal sinus rhythm with nonspecific ST-T wave changes  -Check hemoglobin A1c and lipid panel. Check 2D echocardiogram.  Consult cardiology.  -Holding off on aspirin due to anticipated procedures in a.m.  -Keep n.p.o. from midnight          Code Status: Full code  Surrogate Decision Maker: Wife Artem Dumont    DVT Prophylaxis: SCDs due to hemoptysis  GI Prophylaxis: not indicated    Baseline: From home, independent of ADLs        Subjective:   CHIEF COMPLAINT: Shortness of breath and hemoptysis.     HISTORY OF PRESENT ILLNESS:     Watson Banda is a 70 y.o.   male who presents with past medical history of prostate cancer is coming the hospital chief complaints of shortness of breath and also chronic hematemesis. Patient reports that he has been having hemoptysis since last 1 year and has followed up with pulmonary. He also reports shortness of breath since last 1 month which is even with minimal exertion, with cough and also blood in the sputum. He does not report any chest pain. Does not report any fever or chills. Denies any palpitations or syncopal episodes. He was recently put on an antibiotic and also prednisone due to wheezing. Did not report any abdominal pain, nausea or vomiting. Denies long distance travel, trauma or rash over the chest.  He recently saw pulmonologist Dr. Rachel Torres who did a CT scan of chest which showed some pneumonia according to him. On arrival to ED, he was hypoxic and was placed on nasal cannula. On labs WBC is 15.4 hemoglobin 10.5 platelet count 693. CMP is normal.  Troponin is 92.  proBNP is 94. CT of the chest shows patchy bilateral pneumonia. We were asked to admit for work up and evaluation of the above problems. Past Medical History:   Diagnosis Date    Cancer [facility])     prostate        Past Surgical History:   Procedure Laterality Date    HX ORTHOPAEDIC  1999    spinal fusion       Social History     Tobacco Use    Smoking status: Never Smoker    Smokeless tobacco: Never Used   Substance Use Topics    Alcohol use: Yes     Alcohol/week: 5.0 standard drinks     Types: 6 Shots of liquor per week        Family History   Problem Relation Age of Onset    Lung Disease Mother     Cancer Father      No Known Allergies     Prior to Admission medications    Medication Sig Start Date End Date Taking? Authorizing Provider   loratadine (CLARITIN) 10 mg tablet Take 10 mg by mouth. Other, MD Dillon   albuterol (PROAIR HFA) 90 mcg/actuation inhaler Take 1-2 Puffs by inhalation every four (4) hours as needed for Wheezing. 12/27/19   JOSE Reyes   ferrous sulfate (IRON) 325 mg (65 mg iron) tablet Take  by mouth Daily (before breakfast).     Provider, Historical   multivitamin (ONE A DAY) tablet Take 1 Tab by mouth daily. Naina, MD Dillon       REVIEW OF SYSTEMS:     I am not able to complete the review of systems because: The patient is intubated and sedated    The patient has altered mental status due to his acute medical problems    The patient has baseline aphasia from prior stroke(s)    The patient has baseline dementia and is not reliable historian    The patient is in acute medical distress and unable to provide information           Total of 12 systems reviewed as follows:       POSITIVE= underlined text  Negative = text not underlined  General:  fever, chills, sweats, generalized weakness, weight loss/gain,      loss of appetite   Eyes:    blurred vision, eye pain, loss of vision, double vision  ENT:    rhinorrhea, pharyngitis   Respiratory:   cough, sputum production, SOB, DUNBAR, wheezing, pleuritic pain   Cardiology:   chest pain, palpitations, orthopnea, PND, edema, syncope   Gastrointestinal:  abdominal pain , N/V, diarrhea, dysphagia, constipation, bleeding   Genitourinary:  frequency, urgency, dysuria, hematuria, incontinence   Muskuloskeletal :  arthralgia, myalgia, back pain  Hematology:  easy bruising, nose or gum bleeding, lymphadenopathy   Dermatological: rash, ulceration, pruritis, color change / jaundice  Endocrine:   hot flashes or polydipsia   Neurological:  headache, dizziness, confusion, focal weakness, paresthesia,     Speech difficulties, memory loss, gait difficulty  Psychological: Feelings of anxiety, depression, agitation    Objective:   VITALS:    Visit Vitals  BP (!) 150/78   Pulse 99   Temp 97.9 °F (36.6 °C)   Resp 18   Ht 5' 8\" (1.727 m)   Wt 110.4 kg (243 lb 6.2 oz)   SpO2 94%   BMI 37.01 kg/m²       PHYSICAL EXAM:    General:    Alert, cooperative, no distress, appears stated age.      HEENT: Atraumatic, anicteric sclerae, pink conjunctivae     No oral ulcers, mucosa moist, throat clear, dentition fair  Neck:  Supple, symmetrical,  thyroid: non tender  Lungs:   Bilateral air entry present, crackles present, mild wheezing present  Chest wall:  No tenderness  No Accessory muscle use. Heart:   Regular  rhythm,  No  murmur   No edema  Abdomen:   Soft, non-tender. Not distended. Bowel sounds normal  Extremities: No cyanosis. No clubbing,      Skin turgor normal, Capillary refill normal, Radial dial pulse 2+  Skin:     Not pale. Not Jaundiced  No rashes   Psych:  Not anxious or agitated. Neurologic: EOMs intact. No facial asymmetry. No aphasia or slurred speech. Symmetrical strength, Sensation grossly intact. Alert and oriented X 4.     _______________________________________________________________________  Care Plan discussed with:    Comments   Patient y    Family      RN y    Care Manager                    Consultant:      _______________________________________________________________________  Expected  Disposition:   Home with Family y   HH/PT/OT/RN    SNF/LTC    SARIKA    ________________________________________________________________________  TOTAL TIME:  61 Minutes    Critical Care Provided     Minutes non procedure based      Comments    y Reviewed previous records   >50% of visit spent in counseling and coordination of care y Discussion with patient and/or family and questions answered       ________________________________________________________________________  Signed: Rocio Guzman MD    Procedures: see electronic medical records for all procedures/Xrays and details which were not copied into this note but were reviewed prior to creation of Plan.     LAB DATA REVIEWED:    Recent Results (from the past 24 hour(s))   CBC WITH AUTOMATED DIFF    Collection Time: 04/28/22  3:30 PM   Result Value Ref Range    WBC 15.4 (H) 4.1 - 11.1 K/uL    RBC 3.71 (L) 4.10 - 5.70 M/uL    HGB 10.5 (L) 12.1 - 17.0 g/dL    HCT 31.9 (L) 36.6 - 50.3 %    MCV 86.0 80.0 - 99.0 FL    MCH 28.3 26.0 - 34.0 PG    MCHC 32.9 30.0 - 36.5 g/dL    RDW 16. 4 (H) 11.5 - 14.5 %    PLATELET 756 465 - 449 K/uL    MPV 10.1 8.9 - 12.9 FL    NRBC 0.8 (H) 0  WBC    ABSOLUTE NRBC 0.13 (H) 0.00 - 0.01 K/uL    NEUTROPHILS 86 (H) 32 - 75 %    LYMPHOCYTES 7 (L) 12 - 49 %    MONOCYTES 5 5 - 13 %    EOSINOPHILS 0 0 - 7 %    BASOPHILS 0 0 - 1 %    IMMATURE GRANULOCYTES 2 (H) 0.0 - 0.5 %    ABS. NEUTROPHILS 13.2 (H) 1.8 - 8.0 K/UL    ABS. LYMPHOCYTES 1.1 0.8 - 3.5 K/UL    ABS. MONOCYTES 0.8 0.0 - 1.0 K/UL    ABS. EOSINOPHILS 0.0 0.0 - 0.4 K/UL    ABS. BASOPHILS 0.0 0.0 - 0.1 K/UL    ABS. IMM. GRANS. 0.3 (H) 0.00 - 0.04 K/UL    DF SMEAR SCANNED      RBC COMMENTS ANISOCYTOSIS  1+        RBC COMMENTS OVALOCYTES  PRESENT       METABOLIC PANEL, COMPREHENSIVE    Collection Time: 04/28/22  3:30 PM   Result Value Ref Range    Sodium 138 136 - 145 mmol/L    Potassium 4.3 3.5 - 5.1 mmol/L    Chloride 105 97 - 108 mmol/L    CO2 27 21 - 32 mmol/L    Anion gap 6 5 - 15 mmol/L    Glucose 93 65 - 100 mg/dL    BUN 23 (H) 6 - 20 MG/DL    Creatinine 1.19 0.70 - 1.30 MG/DL    BUN/Creatinine ratio 19 12 - 20      GFR est AA >60 >60 ml/min/1.73m2    GFR est non-AA >60 >60 ml/min/1.73m2    Calcium 9.3 8.5 - 10.1 MG/DL    Bilirubin, total 0.6 0.2 - 1.0 MG/DL    ALT (SGPT) 45 12 - 78 U/L    AST (SGOT) 24 15 - 37 U/L    Alk.  phosphatase 50 45 - 117 U/L    Protein, total 6.9 6.4 - 8.2 g/dL    Albumin 3.6 3.5 - 5.0 g/dL    Globulin 3.3 2.0 - 4.0 g/dL    A-G Ratio 1.1 1.1 - 2.2     TROPONIN-HIGH SENSITIVITY    Collection Time: 04/28/22  3:30 PM   Result Value Ref Range    Troponin-High Sensitivity 92 (HH) 0 - 76 ng/L   NT-PRO BNP    Collection Time: 04/28/22  3:30 PM   Result Value Ref Range    NT pro-BNP 94 <125 PG/ML   EKG, 12 LEAD, INITIAL    Collection Time: 04/28/22  3:41 PM   Result Value Ref Range    Ventricular Rate 85 BPM    Atrial Rate 85 BPM    P-R Interval 178 ms    QRS Duration 76 ms    Q-T Interval 338 ms    QTC Calculation (Bezet) 402 ms    Calculated P Axis 71 degrees    Calculated R Axis 16 degrees    Calculated T Axis 98 degrees    Diagnosis       Normal sinus rhythm  Nonspecific ST abnormality  Confirmed by Jair Morrissey ([id number]) on 4/28/2022 5:26:40 PM     TROPONIN-HIGH SENSITIVITY    Collection Time: 04/28/22  5:56 PM   Result Value Ref Range    Troponin-High Sensitivity 153 (HH) 0 - 76 ng/L   COVID-19 RAPID TEST    Collection Time: 04/28/22  6:35 PM   Result Value Ref Range    Specimen source Nasopharyngeal      COVID-19 rapid test Not detected NOTD     BLOOD GAS,CHEM8,LACTIC ACID POC    Collection Time: 04/28/22  7:33 PM   Result Value Ref Range    Calcium, ionized (POC) 1.28 1.12 - 1.32 mmol/L    BICARBONATE 27 mmol/L    Base excess (POC) 1.9 mmol/L    Sample source VENOUS BLOOD      CO2, POC 28 (H) 19 - 24 MMOL/L    Sodium,  136 - 145 MMOL/L    Potassium, POC 4.3 3.5 - 5.5 MMOL/L    Chloride,  100 - 108 MMOL/L    Glucose, POC 88 74 - 106 MG/DL    Creatinine, POC 0.9 0.6 - 1.3 MG/DL    Lactic Acid (POC) 2.16 (HH) 0.40 - 2.00 mmol/L    Critical value read back MICHAEL     pH, venous (POC) 7.41 7.32 - 7.42      pCO2, venous (POC) 43.1 41 - 51 MMHG    pO2, venous (POC) 36 25 - 40 mmHg

## 2022-04-29 NOTE — PROGRESS NOTES
Hospitalist Progress Note    NAME: Lars Acevedo Sr.   :  1950   MRN:  [id number]       Assessment / Plan:    community-acquired pneumonia  Acute hypoxic respiratory failure secondary to above  Severe sepsis  Hemoptysis chronic (about 4 years per patient)   -Reports hemoptysis x 4 years (intermittent). and has seen Dr. Denver Quivers on outpatient basis. Had diagnostic bronchoscopy in 2019. Bronchial washings were negative for AFB, cultures were also negative including fungus. Cytology was negative for malignancy. -CT is now showing evidence of bilateral patchy pneumonia. Lactic is elevated at 2.16 and is tachycardic and has leukocytosis  -S/p diagnostic bronchoscopy in 2019. Bronchial washings were negative for AFB, cultures were also negative including fungus. Cytology was negative for malignancy. -Rapid COVID is negative. Respiratory viral panel pending  -Procalcitonin normal  -Continue empiric Rocephin and azithromycin for now. Follow blood culture.    -Continue IV fluid  -We will consult pulmonary for chronic hemoptysis. Rule out vasculitis. Check DALILA level.    -Serial labs  -Wean off supplemental oxygen as tolerated     Troponin elevation likely type II non-STEMI   -Denies chest pain  Troponin 153 > 92  -EKG normal sinus rhythm with nonspecific ST-T wave changes  -Follow-up echocardiogram  -Check hemoglobin A1c and lipid panel.   -Cardiology consulted. Noted plan for stress test  -Aspirin on hold for now due to hemoptysis       ? 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   Counseling on diet, exercise, and weight loss    Estimated discharge date:   Barriers:      Code Status: Full code  Surrogate Decision Maker: Wife Jaime Vasquez     DVT Prophylaxis: SCDs due to hemoptysis  GI Prophylaxis: not indicated     Baseline: From home, independent of ADLs     Subjective:     Chief Complaint / Reason for Physician Visit  Discussed with RN events overnight. Continues to have hemoptysis  Feels better since his been here. Less short of breath. Complains of back pain    Review of Systems:  Symptom Y/N Comments  Symptom Y/N Comments   Fever/Chills    Chest Pain     Poor Appetite    Edema     Cough    Abdominal Pain     Sputum    Joint Pain     SOB/DUNBAR    Pruritis/Rash     Nausea/vomit    Tolerating PT/OT     Diarrhea    Tolerating Diet     Constipation    Other       Could NOT obtain due to:      Objective:     VITALS:   Last 24hrs VS reviewed since prior progress note. Most recent are:  Patient Vitals for the past 24 hrs:   Temp Pulse Resp BP SpO2   04/29/22 0339 98.9 °F (37.2 °C) 85 19 (!) 142/83 (!) 89 %   04/28/22 2244 98.6 °F (37 °C) 80 19 135/70 91 %   04/28/22 2008 97.9 °F (36.6 °C) 99 18 (!) 150/78 94 %   04/28/22 1915 -- 82 16 -- 93 %   04/28/22 1845 -- 82 18 (!) 148/81 93 %   04/28/22 1830 -- 84 20 (!) 140/74 92 %   04/28/22 1816 -- 81 23 134/74 94 %   04/28/22 1803 -- -- -- -- 91 %   04/28/22 1800 -- 83 27 135/78 --   04/28/22 1723 -- 87 16 (!) 172/86 93 %   04/28/22 1519 97.8 °F (36.6 °C) 95 16 136/69 95 %       Intake/Output Summary (Last 24 hours) at 4/29/2022 0757  Last data filed at 4/29/2022 0304  Gross per 24 hour   Intake --   Output 1400 ml   Net -1400 ml        I had a face to face encounter and independently examined this patient on 4/29/2022, as outlined below:  PHYSICAL EXAM:  General: WD, WN. Alert, cooperative, no acute distress    EENT:  EOMI. Anicteric sclerae. MMM  Resp:  CTA bilaterally, no wheezing or rales. No accessory muscle use  CV:  Regular  rhythm,  No edema  GI:  Soft, Non distended, Non tender. +Bowel sounds  Neurologic:  Alert and oriented X 3, normal speech,   Psych:   Good insight. Not anxious nor agitated  Skin:  No rashes.   No jaundice    Reviewed most current lab test results and cultures  YES  Reviewed most current radiology test results   YES  Review and summation of old records today    NO  Reviewed patient's current orders and MAR    YES  PMH/SH reviewed - no change compared to H&P  ________________________________________________________________________  Care Plan discussed with:    Comments   Patient x    Family      RN     Care Manager     Consultant                        Multidiciplinary team rounds were held today with , nursing, pharmacist and clinical coordinator. Patient's plan of care was discussed; medications were reviewed and discharge planning was addressed. ________________________________________________________________________  Total NON critical care TIME:  30   Minutes    Total CRITICAL CARE TIME Spent:   Minutes non procedure based      Comments   >50% of visit spent in counseling and coordination of care x    ________________________________________________________________________  Opal Neri MD     Procedures: see electronic medical records for all procedures/Xrays and details which were not copied into this note but were reviewed prior to creation of Plan. LABS:  I reviewed today's most current labs and imaging studies.   Pertinent labs include:  Recent Labs     04/29/22  0504 04/28/22  1530   WBC 14.1* 15.4*   HGB 9.1* 10.5*   HCT 29.0* 31.9*    310     Recent Labs     04/29/22  0504 04/28/22  1530    138   K 4.2 4.3   * 105   CO2 27 27   GLU 78 93   BUN 19 23*   CREA 0.82 1.19   CA 8.4* 9.3   ALB  --  3.6   TBILI  --  0.6   ALT  --  45   INR 1.1  --        Signed: Opal Neri MD

## 2022-04-29 NOTE — CONSULTS
[facility]     Date of  Admission: 4/28/2022  5:24 PM     Admission type:Emergency    Referral for: elevated troponin  Referral by:hospitalist     Subjective:     Noa Espinoza is a 70 y.o. male admitted for CAP (community acquired pneumonia) [J18.9]  Sepsis ([facility] [address] [A41.9]  Hemoptysis [R04.2]. Aimee Torres is a 70 y.o.   male who is admitted with c/o worsening SOB, DUNBAR, hemoptysis (chronic and followed by [facility]). Recently started on steroids and antibx. Denies CP, dizziness/lightheadedness, palpitations. On arrival to ED, he was hypoxic and was placed on nasal cannula. On labs WBC is 15.4 hemoglobin 10.5 platelet count 867. CT of the chest shows patchy bilateral pneumonia. ECG SR with TWave inversion ant/lat  Troponin -95  NTproBNP 94    Previous treatment/evaluation includes echocardiogram and exercise treadmill test (both many years ago) . Cardiac risk factors: dyslipidemia, obesity, sedentary life style, male gender, hypertension. Patient Active Problem List    Diagnosis Date Noted    Elevated troponin 04/29/2022    CAP (community acquired pneumonia) 04/28/2022    Sepsis ([facility] [address] 04/28/2022    Hemoptysis 04/28/2022      Mike Thomason MD  Past Medical History:   Diagnosis Date    Cancer [facility])     prostate    Elevated troponin 4/29/2022      Social History     Socioeconomic History    Marital status: SINGLE   Tobacco Use    Smoking status: Never Smoker    Smokeless tobacco: Never Used   Substance and Sexual Activity    Alcohol use:  Yes     Alcohol/week: 5.0 standard drinks     Types: 6 Shots of liquor per week    Drug use: No     No Known Allergies   Family History   Problem Relation Age of Onset    Lung Disease Mother     Cancer Father       Current Facility-Administered Medications   Medication Dose Route Frequency    sodium chloride (NS) flush 5-40 mL  5-40 mL IntraVENous Q8H    sodium chloride (NS) flush 5-40 mL  5-40 mL IntraVENous PRN    acetaminophen (TYLENOL) tablet 650 mg  650 mg Oral Q6H PRN    Or    acetaminophen (TYLENOL) suppository 650 mg  650 mg Rectal Q6H PRN    polyethylene glycol (MIRALAX) packet 17 g  17 g Oral DAILY PRN    ondansetron (ZOFRAN ODT) tablet 4 mg  4 mg Oral Q8H PRN    Or    ondansetron (ZOFRAN) injection 4 mg  4 mg IntraVENous Q6H PRN    0.9% sodium chloride infusion  75 mL/hr IntraVENous CONTINUOUS    cefTRIAXone (ROCEPHIN) 1 g in 0.9% sodium chloride (MBP/ADV) 50 mL MBP  1 g IntraVENous Q24H    azithromycin (ZITHROMAX) 500 mg in 0.9% sodium chloride 250 mL (VIAL-MATE)  500 mg IntraVENous Q24H      Review of Symptoms: as above  Constitutional: negative  Eyes: negative  Ears, nose, mouth, throat, and face: negative  Respiratory: SOB, DUNBAR, hemoptysis  Cardiovascular: negative  Gastrointestinal: negative  Genitourinary:negative  Musculoskeletal:negative  Neurological: negative  Behvioral/Psych: negative  Endocrine: negative      Objective:      Visit Vitals  /82   Pulse 84   Temp 97.6 °F (36.4 °C)   Resp 18   Ht 5' 8\" (1.727 m)   Wt 110.4 kg (243 lb 6.2 oz)   SpO2 91%   BMI 37.01 kg/m²       Physical Assessment:   General Appearance:  obese older AAM in no acute distress; alert, cooperative, well nourished, well developed; appears stated age  Eyes: sclera anicteric  Mouth/Throat: moist mucous membranes; oral pharynx clear  Neck: supple; no JVD or bruit  Pulmonary:  clear to auscultation bilaterally; good effort  Cardiovascular: regular rate and rhythm; no murmur, click, rub, or gallop  Abdomen: soft, non-tender, non-distended; bowel sounds normal  Musculoskeletal: no swelling or deformity; moves all extremities  Extremities: no edema; palpable distal pulses   Skin: warm and dry  Neuro: grossly normal  Psych: normal mood and affect given the setting      Data Review:   Recent Labs     04/29/22  0504 04/28/22  1530   WBC 14.1* 15.4*   HGB 9.1* 10.5*   HCT 29.0* 31.9*    310     Recent Labs     04/29/22  0504 04/28/22  1530    138   K 4.2 4.3   * 105   CO2 27 27   GLU 78 93   BUN 19 23*   CREA 0.82 1.19   CA 8.4* 9.3   ALB  --  3.6   TBILI  --  0.6   ALT  --  45   INR 1.1  --        Recent Labs     04/29/22  0504 04/28/22  1756 04/28/22  1530   TROPHS 95* 153* 92*         Intake/Output Summary (Last 24 hours) at 4/29/2022 0951  Last data filed at 4/29/2022 0759  Gross per 24 hour   Intake --   Output 2000 ml   Net -2000 ml        Cardiographics    Telemetry: SR    ECG: SR with TWave inversion ant/lat    Echocardiogram: pending    CXRAY:1. Patchy multilobar airspace disease suspicious for Covid pneumonia. 2. No pulmonary embolism. Assessment:       Active Problems:    CAP (community acquired pneumonia) (4/28/2022)      Sepsis ([address] (4/28/2022)      Hemoptysis (4/28/2022)      Elevated troponin (4/29/2022)         Plan:     Elevated troponin/Type 2 MI:  ECG changes and elevated troponin   Multiple risk factors, recommending nuclear stress study to evaluate for ischemia. To be completed today as patient stable. Not starting ASA yet with his hemoptysis - need to discuss with pulmonary if OK to take ASA  Start metoprolol 25mg BID, lipitor. Echo pending, Lipids pending    HTN:  Patient PTA meds amlodipine/benazipril not started, and will continue hold for now. Starting BB    CAP:  Per Hospitalist and pulmonary    Thank you for consulting [facility]. Anthony Nam NP       [facility]             Patient seen, examined by me personally. Plan discussed as detailed. Agree with note as outlined by  NP with modifications as noted. My independent physical exam reveals : Physical Exam  Vitals and nursing note reviewed. Constitutional:       Appearance: He is obese. Cardiovascular:      Rate and Rhythm: Normal rate and regular rhythm. Heart sounds: Normal heart sounds. Pulmonary:      Breath sounds: Normal breath sounds.    Musculoskeletal:      Right lower leg: No edema. Left lower leg: No edema. Neurological:      Mental Status: He is alert and oriented to person, place, and time. Psychiatric:         Mood and Affect: Mood normal.          No additional findings noted. Agree with plan as outlined above with modifications as noted. Non specific troponin elevation in the setting of pneumonia. Will evaluate as noted. Will need to discuss with pulmonary if stress test abnormal as he has chronic hemoptysis.     Charlyne Kussmaul, MD

## 2022-04-29 NOTE — PROGRESS NOTES
[facility] MED: LEXISCAN Cardiac Stress completed at 1515. Please check with ordering Cardiologist regarding pt diet.

## 2022-04-29 NOTE — CONSULTS
Pulmonary, Critical Care, and Sleep Medicine    Initial Patient Consult    Name: Rose Marie Jean. MRN: [id number]   : 1950 Hospital: [facility]   Date: 2022        IMPRESSION:   · Acute respiratory failure  · Abnormal chest ct dating back to   · Hemoptysis for more than a year      RECOMMENDATIONS:   · Wean O2  · Being ruled out for covid  · Pt's chest ct has been abnormal dating back to   · Pt underwent a bronch that was non dx in   · IV abx per attending MD  · Dr Dayami Ochoa will see pt monday     Subjective: This patient has been seen and evaluated at the request of Dr. Alicia Main for abnormal chest ct and hemoptysis for more than 1-2 years. Patient is a 70 y.o. male admitted with sob, hypoxia  Started on empiric abx  Today pt on isolation being ruled  out for covid      Past Medical History:   Diagnosis Date    Cancer [facility])     prostate      Past Surgical History:   Procedure Laterality Date    HX ORTHOPAEDIC      spinal fusion      Prior to Admission medications    Medication Sig Start Date End Date Taking? Authorizing Provider   loratadine (CLARITIN) 10 mg tablet Take 10 mg by mouth. Other, MD Dillon   albuterol (PROAIR HFA) 90 mcg/actuation inhaler Take 1-2 Puffs by inhalation every four (4) hours as needed for Wheezing. 19   JOSE Srinivasan   ferrous sulfate (IRON) 325 mg (65 mg iron) tablet Take  by mouth Daily (before breakfast). Provider, Eugenia   multivitamin (ONE A DAY) tablet Take 1 Tab by mouth daily. Other, MD Dillon     No Known Allergies   Social History     Tobacco Use    Smoking status: Never Smoker    Smokeless tobacco: Never Used   Substance Use Topics    Alcohol use:  Yes     Alcohol/week: 5.0 standard drinks     Types: 6 Shots of liquor per week      Family History   Problem Relation Age of Onset    Lung Disease Mother     Cancer Father         Current Facility-Administered Medications   Medication Dose Route Frequency    sodium chloride (NS) flush 5-40 mL  5-40 mL IntraVENous Q8H    0.9% sodium chloride infusion  75 mL/hr IntraVENous CONTINUOUS    cefTRIAXone (ROCEPHIN) 1 g in 0.9% sodium chloride (MBP/ADV) 50 mL MBP  1 g IntraVENous Q24H    azithromycin (ZITHROMAX) 500 mg in 0.9% sodium chloride 250 mL (VIAL-MATE)  500 mg IntraVENous Q24H       Review of Systems:  A comprehensive review of systems was negative except for: Respiratory: positive for dyspnea on exertion    Objective:   Vital Signs:    Visit Vitals  /82   Pulse 84   Temp 97.6 °F (36.4 °C)   Resp 18   Ht 5' 8\" (1.727 m)   Wt 110.4 kg (243 lb 6.2 oz)   SpO2 91%   BMI 37.01 kg/m²       O2 Device: Nasal airway   O2 Flow Rate (L/min): 4 l/min   Temp (24hrs), Av.2 °F (36.8 °C), Min:97.6 °F (36.4 °C), Max:98.9 °F (37.2 °C)       Intake/Output:   Last shift:      No intake/output data recorded. Last 3 shifts:  1901 -  0700  In: -   Out: 1400 [Urine:1400]    Intake/Output Summary (Last 24 hours) at 2022 1568  Last data filed at 2022 0304  Gross per 24 hour   Intake --   Output 1400 ml   Net -1400 ml      Physical Exam:   General:  Alert, cooperative, no distress, appears stated age. Head:  Normocephalic, without obvious abnormality, atraumatic. Chest wall:  No tenderness or deformity.  No accessory muscle use                           Neurologic: Grossly nonfocal     Data review:     Recent Results (from the past 24 hour(s))   CBC WITH AUTOMATED DIFF    Collection Time: 22  3:30 PM   Result Value Ref Range    WBC 15.4 (H) 4.1 - 11.1 K/uL    RBC 3.71 (L) 4.10 - 5.70 M/uL    HGB 10.5 (L) 12.1 - 17.0 g/dL    HCT 31.9 (L) 36.6 - 50.3 %    MCV 86.0 80.0 - 99.0 FL    MCH 28.3 26.0 - 34.0 PG    MCHC 32.9 30.0 - 36.5 g/dL    RDW 16.4 (H) 11.5 - 14.5 %    PLATELET 240 012 - 594 K/uL    MPV 10.1 8.9 - 12.9 FL    NRBC 0.8 (H) 0  WBC    ABSOLUTE NRBC 0.13 (H) 0.00 - 0.01 K/uL    NEUTROPHILS 86 (H) 32 - 75 %    LYMPHOCYTES 7 (L) 12 - 49 %    MONOCYTES 5 5 - 13 %    EOSINOPHILS 0 0 - 7 %    BASOPHILS 0 0 - 1 %    IMMATURE GRANULOCYTES 2 (H) 0.0 - 0.5 %    ABS. NEUTROPHILS 13.2 (H) 1.8 - 8.0 K/UL    ABS. LYMPHOCYTES 1.1 0.8 - 3.5 K/UL    ABS. MONOCYTES 0.8 0.0 - 1.0 K/UL    ABS. EOSINOPHILS 0.0 0.0 - 0.4 K/UL    ABS. BASOPHILS 0.0 0.0 - 0.1 K/UL    ABS. IMM. GRANS. 0.3 (H) 0.00 - 0.04 K/UL    DF SMEAR SCANNED      RBC COMMENTS ANISOCYTOSIS  1+        RBC COMMENTS OVALOCYTES  PRESENT       METABOLIC PANEL, COMPREHENSIVE    Collection Time: 04/28/22  3:30 PM   Result Value Ref Range    Sodium 138 136 - 145 mmol/L    Potassium 4.3 3.5 - 5.1 mmol/L    Chloride 105 97 - 108 mmol/L    CO2 27 21 - 32 mmol/L    Anion gap 6 5 - 15 mmol/L    Glucose 93 65 - 100 mg/dL    BUN 23 (H) 6 - 20 MG/DL    Creatinine 1.19 0.70 - 1.30 MG/DL    BUN/Creatinine ratio 19 12 - 20      GFR est AA >60 >60 ml/min/1.73m2    GFR est non-AA >60 >60 ml/min/1.73m2    Calcium 9.3 8.5 - 10.1 MG/DL    Bilirubin, total 0.6 0.2 - 1.0 MG/DL    ALT (SGPT) 45 12 - 78 U/L    AST (SGOT) 24 15 - 37 U/L    Alk.  phosphatase 50 45 - 117 U/L    Protein, total 6.9 6.4 - 8.2 g/dL    Albumin 3.6 3.5 - 5.0 g/dL    Globulin 3.3 2.0 - 4.0 g/dL    A-G Ratio 1.1 1.1 - 2.2     TROPONIN-HIGH SENSITIVITY    Collection Time: 04/28/22  3:30 PM   Result Value Ref Range    Troponin-High Sensitivity 92 (HH) 0 - 76 ng/L   NT-PRO BNP    Collection Time: 04/28/22  3:30 PM   Result Value Ref Range    NT pro-BNP 94 <125 PG/ML   EKG, 12 LEAD, INITIAL    Collection Time: 04/28/22  3:41 PM   Result Value Ref Range    Ventricular Rate 85 BPM    Atrial Rate 85 BPM    P-R Interval 178 ms    QRS Duration 76 ms    Q-T Interval 338 ms    QTC Calculation (Bezet) 402 ms    Calculated P Axis 71 degrees    Calculated R Axis 16 degrees    Calculated T Axis 98 degrees    Diagnosis       Normal sinus rhythm  Nonspecific ST abnormality  Confirmed by Anita Butterfield ([id number]) on 4/28/2022 5:26:40 PM TROPONIN-HIGH SENSITIVITY    Collection Time: 04/28/22  5:56 PM   Result Value Ref Range    Troponin-High Sensitivity 153 (HH) 0 - 76 ng/L   COVID-19 RAPID TEST    Collection Time: 04/28/22  6:35 PM   Result Value Ref Range    Specimen source Nasopharyngeal      COVID-19 rapid test Not detected NOTD     CULTURE, BLOOD, PAIRED    Collection Time: 04/28/22  7:00 PM    Specimen: Blood   Result Value Ref Range    Special Requests: NO SPECIAL REQUESTS      Culture result: NO GROWTH AFTER 12 HOURS     BLOOD GAS,CHEM8,LACTIC ACID POC    Collection Time: 04/28/22  7:33 PM   Result Value Ref Range    Calcium, ionized (POC) 1.28 1.12 - 1.32 mmol/L    BICARBONATE 27 mmol/L    Base excess (POC) 1.9 mmol/L    Sample source VENOUS BLOOD      CO2, POC 28 (H) 19 - 24 MMOL/L    Sodium,  136 - 145 MMOL/L    Potassium, POC 4.3 3.5 - 5.5 MMOL/L    Chloride,  100 - 108 MMOL/L    Glucose, POC 88 74 - 106 MG/DL    Creatinine, POC 0.9 0.6 - 1.3 MG/DL    Lactic Acid (POC) 2.16 (HH) 0.40 - 2.00 mmol/L    Critical value read back MICHAEL     pH, venous (POC) 7.41 7.32 - 7.42      pCO2, venous (POC) 43.1 41 - 51 MMHG    pO2, venous (POC) 36 25 - 40 mmHg   LACTIC ACID    Collection Time: 04/29/22 12:00 AM   Result Value Ref Range    Lactic acid 1.1 0.4 - 2.0 MMOL/L   METABOLIC PANEL, BASIC    Collection Time: 04/29/22  5:04 AM   Result Value Ref Range    Sodium 142 136 - 145 mmol/L    Potassium 4.2 3.5 - 5.1 mmol/L    Chloride 110 (H) 97 - 108 mmol/L    CO2 27 21 - 32 mmol/L    Anion gap 5 5 - 15 mmol/L    Glucose 78 65 - 100 mg/dL    BUN 19 6 - 20 MG/DL    Creatinine 0.82 0.70 - 1.30 MG/DL    BUN/Creatinine ratio 23 (H) 12 - 20      GFR est AA >60 >60 ml/min/1.73m2    GFR est non-AA >60 >60 ml/min/1.73m2    Calcium 8.4 (L) 8.5 - 10.1 MG/DL   CBC W/O DIFF    Collection Time: 04/29/22  5:04 AM   Result Value Ref Range    WBC 14.1 (H) 4.1 - 11.1 K/uL    RBC 3.30 (L) 4.10 - 5.70 M/uL    HGB 9.1 (L) 12.1 - 17.0 g/dL    HCT 29.0 (L) 36.6 - 50.3 %    MCV 87.9 80.0 - 99.0 FL    MCH 27.6 26.0 - 34.0 PG    MCHC 31.4 30.0 - 36.5 g/dL    RDW 16.5 (H) 11.5 - 14.5 %    PLATELET 406 375 - 366 K/uL    MPV 9.9 8.9 - 12.9 FL    NRBC 0.4 (H) 0  WBC    ABSOLUTE NRBC 0.06 (H) 0.00 - 0.01 K/uL   PROCALCITONIN    Collection Time: 04/29/22  5:04 AM   Result Value Ref Range    Procalcitonin 0.07 ng/mL   TROPONIN-HIGH SENSITIVITY    Collection Time: 04/29/22  5:04 AM   Result Value Ref Range    Troponin-High Sensitivity 95 (HH) 0 - 76 ng/L   PROTHROMBIN TIME + INR    Collection Time: 04/29/22  5:04 AM   Result Value Ref Range    INR 1.1 0.9 - 1.1      Prothrombin time 11.9 (H) 9.0 - 11.1 sec   GLUCOSE, POC    Collection Time: 04/29/22  6:56 AM   Result Value Ref Range    Glucose (POC) 82 65 - 117 mg/dL    Performed by Bright Kathleen        Imaging:  I have personally reviewed the patients radiographs and have reviewed the reports:  Chest ct reviewed        Geoff Viera MD

## 2022-04-29 NOTE — REMOTE MONITORING
Spoke with primary RN David Gomez regarding 6 hour Sepsis bundle. For any questions or concerns, please feel free to call [phone]. Thank you! [id number] [facility]. Roberto Chaudhry RN, BSN.

## 2022-04-30 LAB
ANA SER QL: NEGATIVE
BASOPHILS # BLD: 0.1 K/UL (ref 0–0.1)
BASOPHILS NFR BLD: 1 % (ref 0–1)
DIFFERENTIAL METHOD BLD: ABNORMAL
EOSINOPHIL # BLD: 0.4 K/UL (ref 0–0.4)
EOSINOPHIL NFR BLD: 4 % (ref 0–7)
ERYTHROCYTE [DISTWIDTH] IN BLOOD BY AUTOMATED COUNT: 16.5 % (ref 11.5–14.5)
HCT VFR BLD AUTO: 28.8 % (ref 36.6–50.3)
HGB BLD-MCNC: 9.2 G/DL (ref 12.1–17)
IMM GRANULOCYTES # BLD AUTO: 0.2 K/UL (ref 0–0.04)
IMM GRANULOCYTES NFR BLD AUTO: 2 % (ref 0–0.5)
LYMPHOCYTES # BLD: 2.1 K/UL (ref 0.8–3.5)
LYMPHOCYTES NFR BLD: 20 % (ref 12–49)
MCH RBC QN AUTO: 27.8 PG (ref 26–34)
MCHC RBC AUTO-ENTMCNC: 31.9 G/DL (ref 30–36.5)
MCV RBC AUTO: 87 FL (ref 80–99)
MONOCYTES # BLD: 0.7 K/UL (ref 0–1)
MONOCYTES NFR BLD: 6 % (ref 5–13)
NEUTS SEG # BLD: 7 K/UL (ref 1.8–8)
NEUTS SEG NFR BLD: 67 % (ref 32–75)
NRBC # BLD: 0.05 K/UL (ref 0–0.01)
NRBC BLD-RTO: 0.5 PER 100 WBC
PLATELET # BLD AUTO: 260 K/UL (ref 150–400)
PMV BLD AUTO: 9.6 FL (ref 8.9–12.9)
RBC # BLD AUTO: 3.31 M/UL (ref 4.1–5.7)
WBC # BLD AUTO: 10.4 K/UL (ref 4.1–11.1)

## 2022-04-30 PROCEDURE — 74011250636 HC RX REV CODE- 250/636: Performed by: INTERNAL MEDICINE

## 2022-04-30 PROCEDURE — 65270000046 HC RM TELEMETRY

## 2022-04-30 PROCEDURE — 74011000250 HC RX REV CODE- 250: Performed by: INTERNAL MEDICINE

## 2022-04-30 PROCEDURE — 74011250637 HC RX REV CODE- 250/637: Performed by: NURSE PRACTITIONER

## 2022-04-30 PROCEDURE — 36415 COLL VENOUS BLD VENIPUNCTURE: CPT

## 2022-04-30 PROCEDURE — 74011250636 HC RX REV CODE- 250/636: Performed by: STUDENT IN AN ORGANIZED HEALTH CARE EDUCATION/TRAINING PROGRAM

## 2022-04-30 PROCEDURE — 77010033678 HC OXYGEN DAILY

## 2022-04-30 PROCEDURE — 74011250637 HC RX REV CODE- 250/637: Performed by: STUDENT IN AN ORGANIZED HEALTH CARE EDUCATION/TRAINING PROGRAM

## 2022-04-30 PROCEDURE — 99233 SBSQ HOSP IP/OBS HIGH 50: CPT | Performed by: INTERNAL MEDICINE

## 2022-04-30 PROCEDURE — 74011000258 HC RX REV CODE- 258: Performed by: INTERNAL MEDICINE

## 2022-04-30 PROCEDURE — 85025 COMPLETE CBC W/AUTO DIFF WBC: CPT

## 2022-04-30 RX ORDER — GUAIFENESIN 100 MG/5ML
81 LIQUID (ML) ORAL DAILY
Status: DISCONTINUED | OUTPATIENT
Start: 2022-04-30 | End: 2022-05-04 | Stop reason: HOSPADM

## 2022-04-30 RX ADMIN — METOPROLOL TARTRATE 25 MG: 25 TABLET ORAL at 21:00

## 2022-04-30 RX ADMIN — CEFTRIAXONE 1 G: 1 INJECTION, POWDER, FOR SOLUTION INTRAMUSCULAR; INTRAVENOUS at 17:49

## 2022-04-30 RX ADMIN — ASPIRIN 81 MG: 81 TABLET, CHEWABLE ORAL at 09:03

## 2022-04-30 RX ADMIN — SODIUM CHLORIDE 75 ML/HR: 9 INJECTION, SOLUTION INTRAVENOUS at 02:53

## 2022-04-30 RX ADMIN — METOPROLOL TARTRATE 25 MG: 25 TABLET ORAL at 09:03

## 2022-04-30 RX ADMIN — SODIUM CHLORIDE, PRESERVATIVE FREE 10 ML: 5 INJECTION INTRAVENOUS at 05:30

## 2022-04-30 RX ADMIN — AZITHROMYCIN MONOHYDRATE 500 MG: 500 INJECTION, POWDER, LYOPHILIZED, FOR SOLUTION INTRAVENOUS at 19:49

## 2022-04-30 RX ADMIN — ATORVASTATIN CALCIUM 40 MG: 40 TABLET, FILM COATED ORAL at 22:00

## 2022-04-30 NOTE — PROGRESS NOTES
Hospitalist Progress Note    NAME: Karlie Turcios .   :  1950   MRN:  [id number]       Assessment / Plan:    community-acquired pneumonia  Acute hypoxic respiratory failure secondary to above  Severe sepsis  Hemoptysis chronic (about 4 years per patient)   -Reports hemoptysis x 4 years (intermittent) and has seen Dr. Rachel Torres on outpatient basis. Had diagnostic bronchoscopy in 2019. Bronchial washings were negative for AFB, cultures were also negative including fungus. Cytology was negative for malignancy. -CT is now showing evidence of bilateral patchy pneumonia. Lactic is elevated at 2.16 and is tachycardic and has leukocytosis  -Rapid COVID is negative. Respiratory viral panel p negative  -Procalcitonin normal  -Blood culture NGTD  -Continue empiric Rocephin and azithromycin. Leukocytosis improving  -Pulmonary consulted for chronic hemoptysis   -Serial labs  -Wean off supplemental oxygen as tolerated. Currently on 4 L nasal cannula     Troponin elevation likely type II non-STEMI   -Denies chest pain  Troponin 153 > 92  -EKG normal sinus rhythm with nonspecific ST-T wave changes  -Echocardiogram EF 55 to 60%, normal wall motion  -Check hemoglobin A1c and lipid panel.   -Stress test shows distal inferior reversibility suggesting ischemia.   -Cardiology following  -Resume ASA. Continue lipitor       ? 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 Counseling on diet, exercise, and weight loss    Estimated discharge date:   Barriers: May need left heart catheterization      Code Status: Full code  Surrogate Decision Maker: Wife Luan Martin     DVT Prophylaxis: SCDs due to hemoptysis  GI Prophylaxis: not indicated     Baseline: From home, independent of ADLs     Subjective:     Chief Complaint / Reason for Physician Visit  Discussed with RN events overnight. Feels much better from breathing standpoint.   Continues to have hemoptysis. Denies chest pain      Review of Systems:  Symptom Y/N Comments  Symptom Y/N Comments   Fever/Chills    Chest Pain     Poor Appetite    Edema     Cough    Abdominal Pain     Sputum    Joint Pain     SOB/DUNBAR    Pruritis/Rash     Nausea/vomit    Tolerating PT/OT     Diarrhea    Tolerating Diet     Constipation    Other       Could NOT obtain due to:      Objective:     VITALS:   Last 24hrs VS reviewed since prior progress note. Most recent are:  Patient Vitals for the past 24 hrs:   Temp Pulse Resp BP SpO2   04/30/22 0300 98.8 °F (37.1 °C) 81 20 128/82 90 %   04/30/22 0000 -- 89 -- -- --   04/29/22 2304 98.7 °F (37.1 °C) 89 18 (!) 143/87 (!) 89 %   04/29/22 2000 -- 88 -- -- --   04/29/22 1930 97.7 °F (36.5 °C) 88 18 132/67 92 %   04/29/22 1709 -- 87 -- (!) 155/89 --   04/29/22 1602 98 °F (36.7 °C) 89 18 137/79 94 %   04/29/22 1518 -- -- -- 132/77 --   04/29/22 1110 98 °F (36.7 °C) 86 16 132/77 92 %   04/29/22 0759 97.6 °F (36.4 °C) 84 18 125/82 91 %       Intake/Output Summary (Last 24 hours) at 4/30/2022 0756  Last data filed at 4/30/2022 0541  Gross per 24 hour   Intake 763.75 ml   Output 1450 ml   Net -686.25 ml        I had a face to face encounter and independently examined this patient on 4/30/2022, as outlined below:  PHYSICAL EXAM:  General: WD, WN. Alert, cooperative, no acute distress    EENT:  EOMI. Anicteric sclerae. MMM  Resp:  CTA bilaterally, no wheezing or rales. No accessory muscle use  CV:  Regular  rhythm,  No edema  GI:  Soft, Non distended, Non tender. +Bowel sounds  Neurologic:  Alert and oriented X 3, normal speech,   Psych:   Good insight. Not anxious nor agitated  Skin:  No rashes.   No jaundice    Reviewed most current lab test results and cultures  YES  Reviewed most current radiology test results   YES  Review and summation of old records today    NO  Reviewed patient's current orders and MAR    YES  PMH/SH reviewed - no change compared to H&P  ________________________________________________________________________  Care Plan discussed with:    Comments   Patient x    Family      RN     Care Manager     Consultant                        Multidiciplinary team rounds were held today with , nursing, pharmacist and clinical coordinator. Patient's plan of care was discussed; medications were reviewed and discharge planning was addressed. ________________________________________________________________________  Total NON critical care TIME:  30   Minutes    Total CRITICAL CARE TIME Spent:   Minutes non procedure based      Comments   >50% of visit spent in counseling and coordination of care x    ________________________________________________________________________  Konstantin Encinas MD     Procedures: see electronic medical records for all procedures/Xrays and details which were not copied into this note but were reviewed prior to creation of Plan. LABS:  I reviewed today's most current labs and imaging studies.   Pertinent labs include:  Recent Labs     04/30/22  0236 04/29/22  0504 04/28/22  1530   WBC 10.4 14.1* 15.4*   HGB 9.2* 9.1* 10.5*   HCT 28.8* 29.0* 31.9*    249 310     Recent Labs     04/29/22  0504 04/28/22  1530    138   K 4.2 4.3   * 105   CO2 27 27   GLU 78 93   BUN 19 23*   CREA 0.82 1.19   CA 8.4* 9.3   ALB  --  3.6   TBILI  --  0.6   ALT  --  45   INR 1.1  --        Signed: Konstantin Encinas MD

## 2022-04-30 NOTE — PROGRESS NOTES
0730 Bedside shift change report given to [address] (oncoming nurse) by Jovana Barrios (offgoing nurse). Report included the following information SBAR, Kardex, ED Summary, MAR, Recent Results and Cardiac Rhythm NSR.

## 2022-04-30 NOTE — PROGRESS NOTES
Cardiology Progress Note      4/30/2022 10:35 AM    Admit Date: 4/28/2022    Admit Diagnosis: CAP (community acquired pneumonia) [J18.9]; Sepsis ([address] [A41.9]; Hemoptysis [R04.2]      Subjective:     Emmie Chaudhry Sr. is feeling better. Stress test suggestive of ischemia. Visit Vitals  /82   Pulse 80   Temp 97.8 °F (36.6 °C)   Resp 18   Ht 5' 8\" (1.727 m)   Wt 243 lb 6.2 oz (110.4 kg)   SpO2 93%   BMI 37.01 kg/m²       Current Facility-Administered Medications   Medication Dose Route Frequency    aspirin chewable tablet 81 mg  81 mg Oral DAILY    atorvastatin (LIPITOR) tablet 40 mg  40 mg Oral QHS    metoprolol tartrate (LOPRESSOR) tablet 25 mg  25 mg Oral Q12H    sodium chloride (NS) flush 5-40 mL  5-40 mL IntraVENous Q8H    sodium chloride (NS) flush 5-40 mL  5-40 mL IntraVENous PRN    acetaminophen (TYLENOL) tablet 650 mg  650 mg Oral Q6H PRN    Or    acetaminophen (TYLENOL) suppository 650 mg  650 mg Rectal Q6H PRN    polyethylene glycol (MIRALAX) packet 17 g  17 g Oral DAILY PRN    ondansetron (ZOFRAN ODT) tablet 4 mg  4 mg Oral Q8H PRN    Or    ondansetron (ZOFRAN) injection 4 mg  4 mg IntraVENous Q6H PRN    cefTRIAXone (ROCEPHIN) 1 g in 0.9% sodium chloride (MBP/ADV) 50 mL MBP  1 g IntraVENous Q24H    azithromycin (ZITHROMAX) 500 mg in 0.9% sodium chloride 250 mL (VIAL-MATE)  500 mg IntraVENous Q24H         Objective:      Physical Exam:  Visit Vitals  BP (!) 148/73   Pulse 83   Temp 98.2 °F (36.8 °C)   Resp 18   Ht 5' 8\" (1.727 m)   Wt 243 lb 6.2 oz (110.4 kg)   SpO2 92%   BMI 37.01 kg/m²     General Appearance:  Well developed, well nourished,alert and oriented x 3, and individual in no acute distress. Ears/Nose/Mouth/Throat:   Hearing grossly normal.         Neck: Supple. Chest:   Lungs clear to auscultation bilaterally. Cardiovascular:  Regular rate and rhythm, S1, S2 normal, no murmur. Abdomen:   Soft, non-tender, bowel sounds are active.    Extremities: No edema bilaterally. Skin: Warm and dry.                Data Review:   Labs:    Recent Results (from the past 24 hour(s))   NUCLEAR CARDIAC STRESS TEST    Collection Time: 04/29/22  3:08 PM   Result Value Ref Range    Stress Target  bpm    Exercise Duration Time 1 min    Exercuse Duration Seconds 5 sec    Stress Systolic  mmHg    Stress Diastolic BP 82 mmHg    Stress Peak  BPM    Baseline HR 82 BPM    Stress Estimated Workload 1.0 METS    Stress Rate Pressure Product 15,600 BPM*mmHg    Stress Percent HR Achieved 70 %    Baseline Systolic  mmHg    Baseline Diastolic BP 89 mmHg    Baseline O2 Sat 92 %    Stress O2 Sat 95 %    Baseline ST Depression 0 mm   ECHO ADULT COMPLETE    Collection Time: 04/29/22  3:52 PM   Result Value Ref Range    IVSd 1.8 (A) 0.6 - 1.0 cm    LVIDd 4.1 (A) 4.2 - 5.9 cm    LVIDs 3.1 cm    LVOT Diameter 2.0 cm    LVPWd 1.8 (A) 0.6 - 1.0 cm    LVOT Peak Gradient 6 mmHg    LVOT Peak Gradient 6 mmHg    LVOT Mean Gradient 3 mmHg    LVOT SV 63.1 ml    LVOT Peak Velocity 1.2 m/s    LVOT Peak Velocity 1.2 m/s    LVOT VTI 20.1 cm    RVIDd 3.3 cm    RV Free Wall Peak S' 15 cm/s    RVOT Peak Gradient 3 mmHg    RVOT Peak Velocity 0.9 m/s    LA Diameter 4.0 cm    LA Volume A/L 64 mL    LA Volume 2C 64 (A) 18 - 58 mL    LA Volume 4C 36 18 - 58 mL    AV Cusp Mmode 1.6 cm    AV Area by Peak Velocity 2.5 cm2    AV Area by Peak Velocity 2.5 cm2    AV Area by Peak Velocity 2.6 cm2    AV Area by Peak Velocity 2.6 cm2    AV Area by VTI 2.7 cm2    AV Peak Gradient 9 mmHg    AV Peak Gradient 9 mmHg    AV Mean Gradient 5 mmHg    AV Peak Velocity 1.5 m/s    AV Peak Velocity 1.5 m/s    AV Mean Velocity 1.1 m/s    AV VTI 24.1 cm    MV A Velocity 0.84 m/s    MV E Velocity 0.48 m/s    LV E' Lateral Velocity 8 cm/s    LV E' Septal Velocity 4 cm/s    PV Peak Gradient 4 mmHg    PV Max Velocity 1.0 m/s    TAPSE 2.5 1.7 cm    Aortic Root 3.6 cm    Fractional Shortening 2D 24 28 - 44 %    LVIDd Index 1.85 cm/m2    LVIDs Index 1.40 cm/m2    LV RWT Ratio 0.88     LV Mass 2D 323.1 (A) 88 - 224 g    LV Mass 2D Index 145.5 (A) 49 - 115 g/m2    MV E/A 0.57     E/E' Ratio (Averaged) 9.00     E/E' Lateral 6.00     E/E' Septal 12.00     LA Volume Index A/L 29 16 - 34 mL/m2    LVOT Stroke Volume Index 28.4 mL/m2    LVOT Area 3.1 cm2    LA Volume Index 2C 29 16 - 34 mL/m2    LA Volume Index 4C 16 16 - 34 mL/m2    LA Size Index 1.80 cm/m2    LA/AO Root Ratio 1.11     Ao Root Index 1.62 cm/m2    LVOT:AV VTI Index 0.83     HANS/BSA VTI 1.2 cm2/m2   CBC WITH AUTOMATED DIFF    Collection Time: 04/30/22  2:36 AM   Result Value Ref Range    WBC 10.4 4.1 - 11.1 K/uL    RBC 3.31 (L) 4.10 - 5.70 M/uL    HGB 9.2 (L) 12.1 - 17.0 g/dL    HCT 28.8 (L) 36.6 - 50.3 %    MCV 87.0 80.0 - 99.0 FL    MCH 27.8 26.0 - 34.0 PG    MCHC 31.9 30.0 - 36.5 g/dL    RDW 16.5 (H) 11.5 - 14.5 %    PLATELET 582 854 - 415 K/uL    MPV 9.6 8.9 - 12.9 FL    NRBC 0.5 (H) 0  WBC    ABSOLUTE NRBC 0.05 (H) 0.00 - 0.01 K/uL    NEUTROPHILS 67 32 - 75 %    LYMPHOCYTES 20 12 - 49 %    MONOCYTES 6 5 - 13 %    EOSINOPHILS 4 0 - 7 %    BASOPHILS 1 0 - 1 %    IMMATURE GRANULOCYTES 2 (H) 0.0 - 0.5 %    ABS. NEUTROPHILS 7.0 1.8 - 8.0 K/UL    ABS. LYMPHOCYTES 2.1 0.8 - 3.5 K/UL    ABS. MONOCYTES 0.7 0.0 - 1.0 K/UL    ABS. EOSINOPHILS 0.4 0.0 - 0.4 K/UL    ABS. BASOPHILS 0.1 0.0 - 0.1 K/UL    ABS. IMM. GRANS. 0.2 (H) 0.00 - 0.04 K/UL    DF AUTOMATED         Telemetry: normal sinus rhythm      Assessment:     Hospital Problems  Date Reviewed: 5/27/2021          Codes Class Noted POA    Elevated troponin ICD-10-CM: R77.8  ICD-9-CM: 790.6  4/29/2022 Unknown        CAP (community acquired pneumonia) ICD-10-CM: J18.9  ICD-9-CM: 264  4/28/2022 Unknown        Sepsis ([facility] [address] ICD-10-CM: A41.9  ICD-9-CM: 038.9, 995.91  4/28/2022 Unknown        Hemoptysis ICD-10-CM: R04.2  ICD-9-CM: 786.30  4/28/2022 Unknown              Plan:     Elevated troponin-    Discussed cath/PCI.  Concern for worsening hemoptysis if intervention needed. Will discuss with pulmonary. Patient willing to proceed. One option would be to proceed with diagnostic angiography Monday to assess risk.     eBa Perez MD

## 2022-04-30 NOTE — PROGRESS NOTES
Problem: Activity Intolerance  Goal: *Oxygen saturation during activity within specified parameters  Outcome: Progressing Towards Goal     Problem:  Activity Intolerance  Goal: *Able to remain out of bed as prescribed  Outcome: Progressing Towards Goal     Problem: Patient Education: Go to Patient Education Activity  Goal: Patient/Family Education  Outcome: Progressing Towards Goal

## 2022-04-30 NOTE — PROGRESS NOTES
1900  Bedside shift change report given to Ivette Calles RN (oncoming nurse) by Cadence Sharma RN (offgoing nurse). Report included the following information SBAR.     0700  End of Shift Note    Bedside shift change report given to Tremaine Spring RN (oncoming nurse) by Angie Guzman RN (offgoing nurse). Report included the following information SBAR    Shift worked:  7p to 7a     Shift summary and any significant changes:     No changes     Concerns for physician to address:       Zone phone for oncoming shift:          Activity:  Activity Level: Up with Assistance  Number times ambulated in hallways past shift: 0  Number of times OOB to chair past shift: 0    Cardiac:   Cardiac Monitoring: Yes      Cardiac Rhythm: Sinus Rhythm    Access:   Current line(s): PIV     Genitourinary:   Urinary status: voiding    Respiratory:   O2 Device: Nasal cannula  Chronic home O2 use?: NO  Incentive spirometer at bedside: NO       GI:  Last Bowel Movement Date: 04/28/22  Current diet:  ADULT DIET Regular  Passing flatus: YES  Tolerating current diet: YES       Pain Management:   Patient states pain is manageable on current regimen: YES    Skin:  Oscar Score: 23  Interventions: increase time out of bed    Patient Safety:  Fall Score:  Total Score: 0  Interventions: bed/chair alarm and gripper socks       Length of Stay:  Expected LOS: - - -  Actual LOS: 2      Angie Guzman RN

## 2022-04-30 NOTE — PROGRESS NOTES
TRANSFER - OUT REPORT:    Verbal report given to KhadijahMariajoseDavid (name) on [address].  being transferred to [facility] ([facility]) for routine progression of care       Report consisted of patients Situation, Background, Assessment and   Recommendations(SBAR). Information from the following report(s) SBAR, Kardex, ED Summary, [facility] and Cardiac Rhythm NSR was reviewed with the receiving nurse. Lines:   Peripheral IV 04/28/22 Right Antecubital (Active)   Site Assessment Clean, dry, & intact 04/30/22 1600   Phlebitis Assessment 0 04/30/22 1600   Infiltration Assessment 0 04/30/22 1600   Dressing Status Clean, dry, & intact 04/30/22 1600   Dressing Type Transparent;Tape 04/30/22 1600   Hub Color/Line Status Pink 04/30/22 1600   Action Taken Blood drawn 04/29/22 1602   Alcohol Cap Used Yes 04/29/22 1930        Opportunity for questions and clarification was provided.       Patient transported with:   Monitor  O2 @ 4 liters  Registered Nurse  [facility]

## 2022-05-01 LAB
ANION GAP SERPL CALC-SCNC: 5 MMOL/L (ref 5–15)
BASOPHILS # BLD: 0 K/UL (ref 0–0.1)
BASOPHILS NFR BLD: 0 % (ref 0–1)
BUN SERPL-MCNC: 10 MG/DL (ref 6–20)
BUN/CREAT SERPL: 13 (ref 12–20)
CALCIUM SERPL-MCNC: 8.9 MG/DL (ref 8.5–10.1)
CHLORIDE SERPL-SCNC: 106 MMOL/L (ref 97–108)
CO2 SERPL-SCNC: 27 MMOL/L (ref 21–32)
CREAT SERPL-MCNC: 0.8 MG/DL (ref 0.7–1.3)
DIFFERENTIAL METHOD BLD: ABNORMAL
EOSINOPHIL # BLD: 0.2 K/UL (ref 0–0.4)
EOSINOPHIL NFR BLD: 2 % (ref 0–7)
ERYTHROCYTE [DISTWIDTH] IN BLOOD BY AUTOMATED COUNT: 16.7 % (ref 11.5–14.5)
GLUCOSE BLD STRIP.AUTO-MCNC: 139 MG/DL (ref 65–117)
GLUCOSE SERPL-MCNC: 104 MG/DL (ref 65–100)
HCT VFR BLD AUTO: 28.7 % (ref 36.6–50.3)
HGB BLD-MCNC: 8.8 G/DL (ref 12.1–17)
IMM GRANULOCYTES # BLD AUTO: 0 K/UL (ref 0–0.04)
IMM GRANULOCYTES NFR BLD AUTO: 0 % (ref 0–0.5)
LYMPHOCYTES # BLD: 2 K/UL (ref 0.8–3.5)
LYMPHOCYTES NFR BLD: 18 % (ref 12–49)
MCH RBC QN AUTO: 26.8 PG (ref 26–34)
MCHC RBC AUTO-ENTMCNC: 30.7 G/DL (ref 30–36.5)
MCV RBC AUTO: 87.5 FL (ref 80–99)
MONOCYTES # BLD: 0.7 K/UL (ref 0–1)
MONOCYTES NFR BLD: 6 % (ref 5–13)
NEUTS BAND NFR BLD MANUAL: 2 %
NEUTS SEG # BLD: 8.4 K/UL (ref 1.8–8)
NEUTS SEG NFR BLD: 72 % (ref 32–75)
NRBC # BLD: 0.05 K/UL (ref 0–0.01)
NRBC BLD-RTO: 0.4 PER 100 WBC
PLATELET # BLD AUTO: 278 K/UL (ref 150–400)
PMV BLD AUTO: 9.8 FL (ref 8.9–12.9)
POTASSIUM SERPL-SCNC: 4.3 MMOL/L (ref 3.5–5.1)
RBC # BLD AUTO: 3.28 M/UL (ref 4.1–5.7)
RBC MORPH BLD: ABNORMAL
RBC MORPH BLD: ABNORMAL
SERVICE CMNT-IMP: ABNORMAL
SODIUM SERPL-SCNC: 138 MMOL/L (ref 136–145)
WBC # BLD AUTO: 11.3 K/UL (ref 4.1–11.1)
WBC MORPH BLD: ABNORMAL

## 2022-05-01 PROCEDURE — 77010033678 HC OXYGEN DAILY

## 2022-05-01 PROCEDURE — 77030027138 HC INCENT SPIROMETER -A

## 2022-05-01 PROCEDURE — 65270000046 HC RM TELEMETRY

## 2022-05-01 PROCEDURE — 85025 COMPLETE CBC W/AUTO DIFF WBC: CPT

## 2022-05-01 PROCEDURE — 74011250637 HC RX REV CODE- 250/637: Performed by: NURSE PRACTITIONER

## 2022-05-01 PROCEDURE — 74011000258 HC RX REV CODE- 258: Performed by: INTERNAL MEDICINE

## 2022-05-01 PROCEDURE — 74011250637 HC RX REV CODE- 250/637: Performed by: STUDENT IN AN ORGANIZED HEALTH CARE EDUCATION/TRAINING PROGRAM

## 2022-05-01 PROCEDURE — 36415 COLL VENOUS BLD VENIPUNCTURE: CPT

## 2022-05-01 PROCEDURE — 82962 GLUCOSE BLOOD TEST: CPT

## 2022-05-01 PROCEDURE — 74011250637 HC RX REV CODE- 250/637: Performed by: INTERNAL MEDICINE

## 2022-05-01 PROCEDURE — 94760 N-INVAS EAR/PLS OXIMETRY 1: CPT

## 2022-05-01 PROCEDURE — 80048 BASIC METABOLIC PNL TOTAL CA: CPT

## 2022-05-01 PROCEDURE — 74011000250 HC RX REV CODE- 250: Performed by: INTERNAL MEDICINE

## 2022-05-01 PROCEDURE — 74011250636 HC RX REV CODE- 250/636: Performed by: INTERNAL MEDICINE

## 2022-05-01 RX ADMIN — ATORVASTATIN CALCIUM 40 MG: 40 TABLET, FILM COATED ORAL at 20:44

## 2022-05-01 RX ADMIN — CEFTRIAXONE 1 G: 1 INJECTION, POWDER, FOR SOLUTION INTRAMUSCULAR; INTRAVENOUS at 17:31

## 2022-05-01 RX ADMIN — AZITHROMYCIN MONOHYDRATE 500 MG: 500 INJECTION, POWDER, LYOPHILIZED, FOR SOLUTION INTRAVENOUS at 17:50

## 2022-05-01 RX ADMIN — SODIUM CHLORIDE, PRESERVATIVE FREE 10 ML: 5 INJECTION INTRAVENOUS at 12:55

## 2022-05-01 RX ADMIN — METOPROLOL TARTRATE 25 MG: 25 TABLET ORAL at 20:44

## 2022-05-01 RX ADMIN — POLYETHYLENE GLYCOL 3350 17 G: 17 POWDER, FOR SOLUTION ORAL at 20:44

## 2022-05-01 RX ADMIN — ASPIRIN 81 MG: 81 TABLET, CHEWABLE ORAL at 09:00

## 2022-05-01 RX ADMIN — METOPROLOL TARTRATE 25 MG: 25 TABLET ORAL at 09:00

## 2022-05-01 RX ADMIN — SODIUM CHLORIDE, PRESERVATIVE FREE 10 ML: 5 INJECTION INTRAVENOUS at 20:44

## 2022-05-01 NOTE — PROGRESS NOTES
Orthostatics:   Supine--140/81                            Sitting-- 116/55--dizzy                            Standing-- 128/74--no dizziness

## 2022-05-01 NOTE — PROGRESS NOTES
End of Shift Note    Bedside shift change report given to [address] [place] [address] (oncoming nurse) by Ban Andrade (offgoing nurse). Report included the following information SBAR, Kardex, Intake/Output, MAR, Recent Results and Cardiac Rhythm NSR    Shift worked:  7p-7a       Shift summary and any significant changes:     yes     Concerns for physician to address:  none     Zone phone for oncoming shift:   NA       Activity:  Activity Level: Up ad glen  Number times ambulated in hallways past shift: 0  Number of times OOB to chair past shift: 0    Cardiac:   Cardiac Monitoring: Yes      Cardiac Rhythm: Sinus Rhythm    Access:   Current line(s): PIV     Genitourinary:   Urinary status: voiding    Respiratory:   O2 Device: Nasal cannula  Chronic home O2 use?: N/A  Incentive spirometer at bedside: N/A       GI:  Last Bowel Movement Date: 04/28/22  Current diet:  ADULT DIET Regular  Passing flatus: YES  Tolerating current diet: YES       Pain Management:   Patient states pain is manageable on current regimen: YES    Skin:  Oscar Score: 21  Interventions: increase time out of bed    Patient Safety:  Fall Score:  Total Score: 1  Interventions: gripper socks, pt to call before getting OOB and stay with me (per policy)       Length of Stay:  Expected LOS: - - -  Actual LOS: [place] [address]

## 2022-05-01 NOTE — PROGRESS NOTES
Hospitalist Progress Note    NAME: Max Gray Sr.   :  1950   MRN:  [id number]       Assessment / Plan:    community-acquired pneumonia  Acute hypoxic respiratory failure secondary to above  Severe sepsis  Hemoptysis chronic (about 4 years per patient)   -Reports hemoptysis x 4 years (intermittent) and has seen Dr. Abbie Mayfield on outpatient basis. Had diagnostic bronchoscopy in 2019. Bronchial washings were negative for AFB, cultures were also negative including fungus. Cytology was negative for malignancy. -CT is now showing evidence of bilateral patchy pneumonia. Lactic is elevated at 2.16 and is tachycardic and has leukocytosis  -Rapid COVID is negative. Respiratory viral panel negative  -Procalcitonin normal  -Blood culture NGTD  -Continue empiric Rocephin and azithromycin.    -follow h/h, transfuse prn  -Pulmonary following for chronic hemoptysis   -Wean off supplemental oxygen as tolerated. Currently on 4 L nasal cannula. IS use     Troponin elevation likely type II non-STEMI   -Denies chest pain  Troponin 153 > 92  -EKG normal sinus rhythm with nonspecific ST-T wave changes  -Echocardiogram EF 55 to 60%, normal wall motion  -Check hemoglobin A1c and lipid panel.   -Stress test shows distal inferior reversibility suggesting ischemia.   -Cardiology following, possible diagnostic angiography Monday?       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 Counseling on diet, exercise, and weight loss    Estimated discharge date: 5/3  Barriers: May need left heart catheterization    Code Status: Full code  Surrogate Decision Maker: Wife Karma Alfred     DVT Prophylaxis: SCDs due to hemoptysis  GI Prophylaxis: not indicated     Baseline: From home, independent of ADLs     Subjective:     Chief Complaint / Reason for Physician Visit  Discussed with RN events overnight. NAD.   hemoptysis improving      Review of Systems:  Symptom Y/N Comments  Symptom Y/N Comments   Fever/Chills    Chest Pain     Poor Appetite    Edema     Cough    Abdominal Pain     Sputum    Joint Pain     SOB/DUNBAR    Pruritis/Rash     Nausea/vomit    Tolerating PT/OT     Diarrhea    Tolerating Diet     Constipation    Other       Could NOT obtain due to:      Objective:     VITALS:   Last 24hrs VS reviewed since prior progress note. Most recent are:  Patient Vitals for the past 24 hrs:   Temp Pulse Resp BP SpO2   05/01/22 0800 98.4 °F (36.9 °C) 93 19 (!) 140/81 96 %   05/01/22 0406 99.2 °F (37.3 °C) 81 20 113/70 90 %   05/01/22 0020 98.7 °F (37.1 °C) (!) 106 18 128/73 95 %   04/30/22 1956 98.5 °F (36.9 °C) 84 18 135/81 99 %   04/30/22 1730 98 °F (36.7 °C) 86 19 (!) 143/68 97 %   04/30/22 1458 -- 94 -- (!) 168/73 90 %   04/30/22 1456 -- 98 -- (!) 172/81 90 %   04/30/22 1454 -- 97 -- (!) 143/81 90 %   04/30/22 1452 98 °F (36.7 °C) 86 20 (!) 144/73 92 %   04/30/22 1124 98.2 °F (36.8 °C) 83 18 (!) 148/73 92 %       Intake/Output Summary (Last 24 hours) at 5/1/2022 9662  Last data filed at 5/1/2022 0406  Gross per 24 hour   Intake 490 ml   Output 1200 ml   Net -710 ml        I had a face to face encounter and independently examined this patient on 5/1/2022, as outlined below:  PHYSICAL EXAM:  General: WD, WN. Alert, cooperative, no acute distress    EENT:  EOMI. Anicteric sclerae. MMM  Resp:  CTA bilaterally, no wheezing or rales. No accessory muscle use  CV:  Regular  rhythm,  No edema  GI:  Soft, Non distended, Non tender. +Bowel sounds  Neurologic:  Alert and oriented X 3, normal speech,   Psych:   Good insight. Not anxious nor agitated  Skin:  No rashes.   No jaundice    Reviewed most current lab test results and cultures  YES  Reviewed most current radiology test results   YES  Review and summation of old records today    NO  Reviewed patient's current orders and MAR    YES  PMH/SH reviewed - no change compared to H&P  ________________________________________________________________________  Care Plan discussed with:    Comments   Patient x    Family      RN x    Care Manager     Consultant                        Multidiciplinary team rounds were held today with , nursing, pharmacist and clinical coordinator. Patient's plan of care was discussed; medications were reviewed and discharge planning was addressed. ________________________________________________________________________  Total NON critical care TIME:  30   Minutes    Total CRITICAL CARE TIME Spent:   Minutes non procedure based      Comments   >50% of visit spent in counseling and coordination of care x    ________________________________________________________________________  Mariangel Trammell MD     Procedures: see electronic medical records for all procedures/Xrays and details which were not copied into this note but were reviewed prior to creation of Plan. LABS:  I reviewed today's most current labs and imaging studies.   Pertinent labs include:  Recent Labs     05/01/22  0400 04/30/22  0236 04/29/22  0504   WBC 11.3* 10.4 14.1*   HGB 8.8* 9.2* 9.1*   HCT 28.7* 28.8* 29.0*    260 249     Recent Labs     05/01/22  0400 04/29/22  0504 04/28/22  1530    142 138   K 4.3 4.2 4.3    110* 105   CO2 27 27 27   * 78 93   BUN 10 19 23*   CREA 0.80 0.82 1.19   CA 8.9 8.4* 9.3   ALB  --   --  3.6   TBILI  --   --  0.6   ALT  --   --  45   INR  --  1.1  --        Signed: Mariangel Trammell MD

## 2022-05-01 NOTE — PROGRESS NOTES
Reason for Admission:  CAP (community acquired pneumonia), Sepsis ([facility] [address] Hemoptysis                     RUR Score:      6% Low               Plan for utilizing home health:   No prior HH. PCP: First and Last name:  Dennys Roque MD     Name of Practice:    Are you a current patient: Yes/No: Yes   Approximate date of last visit: Dec 28th, 2021   Can you participate in a virtual visit with your PCP:                     Current Advanced Directive/Advance Care Plan: Full Code      Healthcare Decision Maker:     Spouse Vinay Cortez @ [phone] primary decision maker. Click here to complete [address] including selection of the Healthcare Decision Maker Relationship (ie \"Primary\")                             Transition of Care Plan:                      Patient sitting up in the chair when writer entered the room. AOx4. Patient and spouse reside in a single level home w/four steps to enter the front door. Last seen in PCP office Dec 28th, 2021. Upcoming scheduled PCP appointment June 2022. Utilizes [facility] ([facility] [place]) to p/u all medications. Self reports independent w/all ADL's & IADL's and drives himself to all medical appointment's. No home O2 or DME. No prior HH, SNF, and or IRF. Spouse to transport at time of discharge. Requires Medicare 2nd IMM letter prior to discharge. Will continue to monitor re: discharge needs and disposition. Care Management Interventions  PCP Verified by CM: Yes (12/28/2021)  Mode of Transport at Discharge:  Other (see comment) (Family)  Transition of Care Consult (CM Consult): Discharge Planning  Discharge Durable Medical Equipment: No (No home O2 or DME)  Support Systems: Spouse/Significant Other  Confirm Follow Up Transport: Family  Discharge Location  Patient Expects to be Discharged to[de-identified] [address] [id number]

## 2022-05-01 NOTE — PROGRESS NOTES
Cardiology Progress Note      5/1/2022 11:50 AM    Admit Date: 4/28/2022    Admit Diagnosis: CAP (community acquired pneumonia) [J18.9]; Sepsis ([address] [A41.9]; Hemoptysis [R04.2]      Subjective:     Anthony Garcia Sr. is doing well. Denies any chest pain. Visit Vitals  /63   Pulse 81   Temp 97.6 °F (36.4 °C)   Resp 18   Ht 5' 8\" (1.727 m)   Wt 243 lb 6.2 oz (110.4 kg)   SpO2 97%   BMI 37.01 kg/m²       Current Facility-Administered Medications   Medication Dose Route Frequency    aspirin chewable tablet 81 mg  81 mg Oral DAILY    atorvastatin (LIPITOR) tablet 40 mg  40 mg Oral QHS    metoprolol tartrate (LOPRESSOR) tablet 25 mg  25 mg Oral Q12H    sodium chloride (NS) flush 5-40 mL  5-40 mL IntraVENous Q8H    sodium chloride (NS) flush 5-40 mL  5-40 mL IntraVENous PRN    acetaminophen (TYLENOL) tablet 650 mg  650 mg Oral Q6H PRN    Or    acetaminophen (TYLENOL) suppository 650 mg  650 mg Rectal Q6H PRN    polyethylene glycol (MIRALAX) packet 17 g  17 g Oral DAILY PRN    ondansetron (ZOFRAN ODT) tablet 4 mg  4 mg Oral Q8H PRN    Or    ondansetron (ZOFRAN) injection 4 mg  4 mg IntraVENous Q6H PRN    cefTRIAXone (ROCEPHIN) 1 g in 0.9% sodium chloride (MBP/ADV) 50 mL MBP  1 g IntraVENous Q24H    azithromycin (ZITHROMAX) 500 mg in 0.9% sodium chloride 250 mL (VIAL-MATE)  500 mg IntraVENous Q24H         Objective:      Physical Exam:  Visit Vitals  /63   Pulse 81   Temp 97.6 °F (36.4 °C)   Resp 18   Ht 5' 8\" (1.727 m)   Wt 243 lb 6.2 oz (110.4 kg)   SpO2 97%   BMI 37.01 kg/m²     General Appearance:  Well developed, well nourished,alert and oriented x 3, and individual in no acute distress. Ears/Nose/Mouth/Throat:   Hearing grossly normal.         Neck: Supple. Chest:   Lungs clear to auscultation bilaterally. Cardiovascular:  Regular rate and rhythm, S1, S2 normal, no murmur. Abdomen:   Soft, non-tender, bowel sounds are active. Extremities: No edema bilaterally.     Skin: Warm and dry.               Data Review:   Labs:    Recent Results (from the past 24 hour(s))   CBC WITH AUTOMATED DIFF    Collection Time: 05/01/22  4:00 AM   Result Value Ref Range    WBC 11.3 (H) 4.1 - 11.1 K/uL    RBC 3.28 (L) 4.10 - 5.70 M/uL    HGB 8.8 (L) 12.1 - 17.0 g/dL    HCT 28.7 (L) 36.6 - 50.3 %    MCV 87.5 80.0 - 99.0 FL    MCH 26.8 26.0 - 34.0 PG    MCHC 30.7 30.0 - 36.5 g/dL    RDW 16.7 (H) 11.5 - 14.5 %    PLATELET 901 863 - 740 K/uL    MPV 9.8 8.9 - 12.9 FL    NRBC 0.4 (H) 0  WBC    ABSOLUTE NRBC 0.05 (H) 0.00 - 0.01 K/uL    NEUTROPHILS 72 32 - 75 %    BAND NEUTROPHILS 2 %    LYMPHOCYTES 18 12 - 49 %    MONOCYTES 6 5 - 13 %    EOSINOPHILS 2 0 - 7 %    BASOPHILS 0 0 - 1 %    IMMATURE GRANULOCYTES 0 0.0 - 0.5 %    ABS. NEUTROPHILS 8.4 (H) 1.8 - 8.0 K/UL    ABS. LYMPHOCYTES 2.0 0.8 - 3.5 K/UL    ABS. MONOCYTES 0.7 0.0 - 1.0 K/UL    ABS. EOSINOPHILS 0.2 0.0 - 0.4 K/UL    ABS. BASOPHILS 0.0 0.0 - 0.1 K/UL    ABS. IMM.  GRANS. 0.0 0.00 - 0.04 K/UL    DF MANUAL      RBC COMMENTS ANISOCYTOSIS  1+        RBC COMMENTS POLYCHROMASIA  PRESENT        WBC COMMENTS ATYPICAL LYMPHOCYTES PRESENT     METABOLIC PANEL, BASIC    Collection Time: 05/01/22  4:00 AM   Result Value Ref Range    Sodium 138 136 - 145 mmol/L    Potassium 4.3 3.5 - 5.1 mmol/L    Chloride 106 97 - 108 mmol/L    CO2 27 21 - 32 mmol/L    Anion gap 5 5 - 15 mmol/L    Glucose 104 (H) 65 - 100 mg/dL    BUN 10 6 - 20 MG/DL    Creatinine 0.80 0.70 - 1.30 MG/DL    BUN/Creatinine ratio 13 12 - 20      GFR est AA >60 >60 ml/min/1.73m2    GFR est non-AA >60 >60 ml/min/1.73m2    Calcium 8.9 8.5 - 10.1 MG/DL       Telemetry: normal sinus rhythm      Assessment:     Hospital Problems  Date Reviewed: 5/27/2021          Codes Class Noted POA    Elevated troponin ICD-10-CM: R77.8  ICD-9-CM: 790.6  4/29/2022 Unknown        CAP (community acquired pneumonia) ICD-10-CM: J18.9  ICD-9-CM: 328  4/28/2022 Unknown        Sepsis ([facility] [address] ICD-10-CM: A41.9  ICD-9-CM: 038.9, 995.91  4/28/2022 Unknown        Hemoptysis ICD-10-CM: R04.2  ICD-9-CM: 786.30  4/28/2022 Unknown              Plan:     Elevated troponin- abnormal    Cath tomorrow. Timing of PCI/surgery if needed based on discussion with pulmonary.  stress test.       Fabian Corral MD

## 2022-05-02 ENCOUNTER — APPOINTMENT (OUTPATIENT)
Dept: GENERAL RADIOLOGY | Age: 72
DRG: 871 | End: 2022-05-02
Attending: NURSE PRACTITIONER
Payer: MEDICARE

## 2022-05-02 PROBLEM — Z98.890 S/P CARDIAC CATH: Status: ACTIVE | Noted: 2022-05-02

## 2022-05-02 LAB
ANION GAP SERPL CALC-SCNC: 6 MMOL/L (ref 5–15)
BASOPHILS # BLD: 0 K/UL (ref 0–0.1)
BASOPHILS NFR BLD: 0 % (ref 0–1)
BUN SERPL-MCNC: 14 MG/DL (ref 6–20)
BUN/CREAT SERPL: 15 (ref 12–20)
CALCIUM SERPL-MCNC: 9.2 MG/DL (ref 8.5–10.1)
CHLORIDE SERPL-SCNC: 104 MMOL/L (ref 97–108)
CO2 SERPL-SCNC: 27 MMOL/L (ref 21–32)
CREAT SERPL-MCNC: 0.93 MG/DL (ref 0.7–1.3)
DIFFERENTIAL METHOD BLD: ABNORMAL
EOSINOPHIL # BLD: 0.1 K/UL (ref 0–0.4)
EOSINOPHIL NFR BLD: 1 % (ref 0–7)
ERYTHROCYTE [DISTWIDTH] IN BLOOD BY AUTOMATED COUNT: 16.2 % (ref 11.5–14.5)
GLUCOSE SERPL-MCNC: 111 MG/DL (ref 65–100)
HCT VFR BLD AUTO: 29.1 % (ref 36.6–50.3)
HGB BLD-MCNC: 9.3 G/DL (ref 12.1–17)
IMM GRANULOCYTES # BLD AUTO: 0.2 K/UL (ref 0–0.04)
IMM GRANULOCYTES NFR BLD AUTO: 2 % (ref 0–0.5)
LYMPHOCYTES # BLD: 1.2 K/UL (ref 0.8–3.5)
LYMPHOCYTES NFR BLD: 10 % (ref 12–49)
MCH RBC QN AUTO: 28.2 PG (ref 26–34)
MCHC RBC AUTO-ENTMCNC: 32 G/DL (ref 30–36.5)
MCV RBC AUTO: 88.2 FL (ref 80–99)
MONOCYTES # BLD: 0.6 K/UL (ref 0–1)
MONOCYTES NFR BLD: 5 % (ref 5–13)
NEUTS SEG # BLD: 10.1 K/UL (ref 1.8–8)
NEUTS SEG NFR BLD: 82 % (ref 32–75)
NRBC # BLD: 0.04 K/UL (ref 0–0.01)
NRBC BLD-RTO: 0.3 PER 100 WBC
PLATELET # BLD AUTO: 299 K/UL (ref 150–400)
PMV BLD AUTO: 9.6 FL (ref 8.9–12.9)
POTASSIUM SERPL-SCNC: 4.4 MMOL/L (ref 3.5–5.1)
RBC # BLD AUTO: 3.3 M/UL (ref 4.1–5.7)
SODIUM SERPL-SCNC: 137 MMOL/L (ref 136–145)
WBC # BLD AUTO: 12.1 K/UL (ref 4.1–11.1)

## 2022-05-02 PROCEDURE — 74011250636 HC RX REV CODE- 250/636: Performed by: INTERNAL MEDICINE

## 2022-05-02 PROCEDURE — 77030042317 HC BND COMPR HEMSTAT -B: Performed by: INTERNAL MEDICINE

## 2022-05-02 PROCEDURE — 93454 CORONARY ARTERY ANGIO S&I: CPT | Performed by: INTERNAL MEDICINE

## 2022-05-02 PROCEDURE — C1769 GUIDE WIRE: HCPCS | Performed by: INTERNAL MEDICINE

## 2022-05-02 PROCEDURE — 4A023N7 MEASUREMENT OF CARDIAC SAMPLING AND PRESSURE, LEFT HEART, PERCUTANEOUS APPROACH: ICD-10-PCS | Performed by: INTERNAL MEDICINE

## 2022-05-02 PROCEDURE — C1894 INTRO/SHEATH, NON-LASER: HCPCS | Performed by: INTERNAL MEDICINE

## 2022-05-02 PROCEDURE — 99152 MOD SED SAME PHYS/QHP 5/>YRS: CPT | Performed by: INTERNAL MEDICINE

## 2022-05-02 PROCEDURE — 77030010221 HC SPLNT WR POS TELE -B: Performed by: INTERNAL MEDICINE

## 2022-05-02 PROCEDURE — 74011250637 HC RX REV CODE- 250/637: Performed by: NURSE PRACTITIONER

## 2022-05-02 PROCEDURE — 2709999900 HC NON-CHARGEABLE SUPPLY: Performed by: INTERNAL MEDICINE

## 2022-05-02 PROCEDURE — 77030015766: Performed by: INTERNAL MEDICINE

## 2022-05-02 PROCEDURE — 77030019698 HC SYR ANGI MDLON MRTM -A: Performed by: INTERNAL MEDICINE

## 2022-05-02 PROCEDURE — B2111ZZ FLUOROSCOPY OF MULTIPLE CORONARY ARTERIES USING LOW OSMOLAR CONTRAST: ICD-10-PCS | Performed by: INTERNAL MEDICINE

## 2022-05-02 PROCEDURE — 77010033678 HC OXYGEN DAILY

## 2022-05-02 PROCEDURE — 36415 COLL VENOUS BLD VENIPUNCTURE: CPT

## 2022-05-02 PROCEDURE — 77030008543 HC TBNG MON PRSS MRTM -A: Performed by: INTERNAL MEDICINE

## 2022-05-02 PROCEDURE — 74011000250 HC RX REV CODE- 250: Performed by: INTERNAL MEDICINE

## 2022-05-02 PROCEDURE — 74011000258 HC RX REV CODE- 258: Performed by: INTERNAL MEDICINE

## 2022-05-02 PROCEDURE — 85025 COMPLETE CBC W/AUTO DIFF WBC: CPT

## 2022-05-02 PROCEDURE — 74011636637 HC RX REV CODE- 636/637: Performed by: INTERNAL MEDICINE

## 2022-05-02 PROCEDURE — 74011250637 HC RX REV CODE- 250/637: Performed by: STUDENT IN AN ORGANIZED HEALTH CARE EDUCATION/TRAINING PROGRAM

## 2022-05-02 PROCEDURE — 71045 X-RAY EXAM CHEST 1 VIEW: CPT

## 2022-05-02 PROCEDURE — 74011000636 HC RX REV CODE- 636: Performed by: INTERNAL MEDICINE

## 2022-05-02 PROCEDURE — 80048 BASIC METABOLIC PNL TOTAL CA: CPT

## 2022-05-02 PROCEDURE — 77030004549 HC CATH ANGI DX PRF MRTM -A: Performed by: INTERNAL MEDICINE

## 2022-05-02 PROCEDURE — 65270000046 HC RM TELEMETRY

## 2022-05-02 RX ORDER — HEPARIN SODIUM 200 [USP'U]/100ML
INJECTION, SOLUTION INTRAVENOUS
Status: COMPLETED | OUTPATIENT
Start: 2022-05-02 | End: 2022-05-02

## 2022-05-02 RX ORDER — HEPARIN SODIUM 1000 [USP'U]/ML
INJECTION, SOLUTION INTRAVENOUS; SUBCUTANEOUS AS NEEDED
Status: DISCONTINUED | OUTPATIENT
Start: 2022-05-02 | End: 2022-05-02 | Stop reason: HOSPADM

## 2022-05-02 RX ORDER — MIDAZOLAM HYDROCHLORIDE 1 MG/ML
INJECTION, SOLUTION INTRAMUSCULAR; INTRAVENOUS AS NEEDED
Status: DISCONTINUED | OUTPATIENT
Start: 2022-05-02 | End: 2022-05-02 | Stop reason: HOSPADM

## 2022-05-02 RX ORDER — VERAPAMIL HYDROCHLORIDE 2.5 MG/ML
INJECTION, SOLUTION INTRAVENOUS AS NEEDED
Status: DISCONTINUED | OUTPATIENT
Start: 2022-05-02 | End: 2022-05-02 | Stop reason: HOSPADM

## 2022-05-02 RX ORDER — PREDNISONE 20 MG/1
40 TABLET ORAL
Status: DISCONTINUED | OUTPATIENT
Start: 2022-05-03 | End: 2022-05-02

## 2022-05-02 RX ORDER — FENTANYL CITRATE 50 UG/ML
INJECTION, SOLUTION INTRAMUSCULAR; INTRAVENOUS AS NEEDED
Status: DISCONTINUED | OUTPATIENT
Start: 2022-05-02 | End: 2022-05-02 | Stop reason: HOSPADM

## 2022-05-02 RX ORDER — PREDNISONE 20 MG/1
40 TABLET ORAL
Status: DISCONTINUED | OUTPATIENT
Start: 2022-05-02 | End: 2022-05-03

## 2022-05-02 RX ORDER — LIDOCAINE HYDROCHLORIDE 10 MG/ML
INJECTION, SOLUTION EPIDURAL; INFILTRATION; INTRACAUDAL; PERINEURAL AS NEEDED
Status: DISCONTINUED | OUTPATIENT
Start: 2022-05-02 | End: 2022-05-02 | Stop reason: HOSPADM

## 2022-05-02 RX ADMIN — AZITHROMYCIN MONOHYDRATE 500 MG: 500 INJECTION, POWDER, LYOPHILIZED, FOR SOLUTION INTRAVENOUS at 17:58

## 2022-05-02 RX ADMIN — SODIUM CHLORIDE, PRESERVATIVE FREE 10 ML: 5 INJECTION INTRAVENOUS at 06:53

## 2022-05-02 RX ADMIN — SODIUM CHLORIDE, PRESERVATIVE FREE 10 ML: 5 INJECTION INTRAVENOUS at 14:29

## 2022-05-02 RX ADMIN — METOPROLOL TARTRATE 25 MG: 25 TABLET ORAL at 22:40

## 2022-05-02 RX ADMIN — METOPROLOL TARTRATE 25 MG: 25 TABLET ORAL at 10:39

## 2022-05-02 RX ADMIN — PREDNISONE 40 MG: 20 TABLET ORAL at 14:29

## 2022-05-02 RX ADMIN — CEFTRIAXONE 1 G: 1 INJECTION, POWDER, FOR SOLUTION INTRAMUSCULAR; INTRAVENOUS at 17:12

## 2022-05-02 RX ADMIN — ASPIRIN 81 MG: 81 TABLET, CHEWABLE ORAL at 10:39

## 2022-05-02 RX ADMIN — SODIUM CHLORIDE, PRESERVATIVE FREE 10 ML: 5 INJECTION INTRAVENOUS at 22:40

## 2022-05-02 RX ADMIN — ATORVASTATIN CALCIUM 40 MG: 40 TABLET, FILM COATED ORAL at 22:40

## 2022-05-02 NOTE — PROGRESS NOTES
Hospitalist Progress Note    NAME: Anabelle Rosenberg Sr.   :  1950   MRN:  [id number]       Assessment / Plan:    Community-acquired pneumonia  Acute hypoxic respiratory failure secondary to above  Severe sepsis  Hemoptysis chronic (about 4 years per patient)   -Reports hemoptysis x 4 years (intermittent) and has seen Dr. Boris Camacho on outpatient basis. Had diagnostic bronchoscopy in 2019. Bronchial washings were negative for AFB, cultures were also negative including fungus. Cytology was negative for malignancy. -CT is now showing evidence of bilateral patchy pneumonia. Lactic is elevated at 2.16 and is tachycardic and has leukocytosis  -Rapid COVID is negative. Respiratory viral panel negative  -Procalcitonin normal  -Blood culture NGTD  -Continue empiric Rocephin and azithromycin.    -steroids added today  -follow h/h, transfuse prn  -appreciate pulm's recs  -Wean off supplemental oxygen as tolerated. Currently on 4-5 L nasal cannula. Aggressive IS use     Troponin elevation likely type II non-STEMI   -Denies chest pain  Troponin 153 > 92  -EKG normal sinus rhythm with nonspecific ST-T wave changes  -Echocardiogram EF 55 to 60%, normal wall motion  -Stress test shows distal inferior reversibility suggesting ischemia. S/p cath on , final report pending but was informed neg cath. Cont' ASA,  BB, statin  -Cardiology following     ? 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 Counseling on diet, exercise, and weight loss    Estimated discharge date: 5/3  Barriers: May need left heart catheterization    Code Status: Full code  Surrogate Decision Maker: Wife Fany Andrade     DVT Prophylaxis: SCDs due to hemoptysis  GI Prophylaxis: not indicated     Baseline: From home, independent of ADLs     Subjective:     Chief Complaint / Reason for Physician Visit  Discussed with RN events overnight. Pt seen post cath. NAD.   requiring up to 5LNC currently. Review of Systems:  Symptom Y/N Comments  Symptom Y/N Comments   Fever/Chills    Chest Pain     Poor Appetite    Edema     Cough    Abdominal Pain     Sputum    Joint Pain     SOB/DUNBAR    Pruritis/Rash     Nausea/vomit    Tolerating PT/OT     Diarrhea    Tolerating Diet     Constipation    Other       Could NOT obtain due to:      Objective:     VITALS:   Last 24hrs VS reviewed since prior progress note. Most recent are:  Patient Vitals for the past 24 hrs:   Temp Pulse Resp BP SpO2   05/02/22 1155 -- 72 -- 123/71 --   05/02/22 1152 -- 71 -- 134/71 --   05/02/22 1036 97.9 °F (36.6 °C) 81 16 120/67 90 %   05/02/22 0752 98.5 °F (36.9 °C) 86 16 125/81 91 %   05/02/22 0416 97.8 °F (36.6 °C) 83 18 112/73 90 %   05/01/22 2324 -- 85 -- 105/65 --   05/01/22 2238 98.6 °F (37 °C) 89 18 129/72 90 %   05/01/22 1920 99.1 °F (37.3 °C) 99 18 121/69 90 %   05/01/22 1540 99.2 °F (37.3 °C) 93 19 138/65 96 %       Intake/Output Summary (Last 24 hours) at 5/2/2022 1225  Last data filed at 5/1/2022 1545  Gross per 24 hour   Intake --   Output 300 ml   Net -300 ml        I had a face to face encounter and independently examined this patient on 5/2/2022, as outlined below:  PHYSICAL EXAM:  General: WD, WN. Alert, cooperative, no acute distress    EENT:  EOMI. Anicteric sclerae. MMM  Resp:  Diminished bs b/l. no wheezing or rales. No accessory muscle use  CV:  Regular  rhythm,  No edema  GI:  Soft, Non distended, Non tender. +Bowel sounds  Neurologic:  Alert and oriented X 3, normal speech,   Psych:   Good insight. Not anxious nor agitated  Skin:  No rashes.   No jaundice    Reviewed most current lab test results and cultures  YES  Reviewed most current radiology test results   YES  Review and summation of old records today    NO  Reviewed patient's current orders and MAR    YES  PMH/SH reviewed - no change compared to H&P  ________________________________________________________________________  Care Plan discussed with:    Comments   Patient x    Family      RN x    Care Manager     Consultant  x Dr Natalia Pennington team rounds were held today with , nursing, pharmacist and clinical coordinator. Patient's plan of care was discussed; medications were reviewed and discharge planning was addressed. ________________________________________________________________________  Total NON critical care TIME:  30   Minutes    Total CRITICAL CARE TIME Spent:   Minutes non procedure based      Comments   >50% of visit spent in counseling and coordination of care x    ________________________________________________________________________  Eda Landon MD     Procedures: see electronic medical records for all procedures/Xrays and details which were not copied into this note but were reviewed prior to creation of Plan. LABS:  I reviewed today's most current labs and imaging studies.   Pertinent labs include:  Recent Labs     05/02/22  0330 05/01/22  0400 04/30/22  0236   WBC 12.1* 11.3* 10.4   HGB 9.3* 8.8* 9.2*   HCT 29.1* 28.7* 28.8*    278 260     Recent Labs     05/02/22  0330 05/01/22  0400    138   K 4.4 4.3    106   CO2 27 27   * 104*   BUN 14 10   CREA 0.93 0.80   CA 9.2 8.9       Signed: Eda Landon MD

## 2022-05-02 NOTE — CONSULTS
Pulmonary, Critical Care, and Sleep Medicine    Patient Consult    Name: Kaila Woods. MRN: [id number]   : 1950 Hospital: [facility]   Date: 2022        IMPRESSION:   · Acute respiratory failure, on 6L NC.   · Ruled out for Covid. 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 NO intervention has been needed up to this point. IF the hemoptysis returns would refer to [facility] for [facility] Rx of the mucosal abnormalities. · Hemoptysis seems worse recently, May be due to acute infection due to CAP. · Agree with Cardiology doing Cath, can be on blood thinners if needed. Discussed with Dr. Josep Riggs this am.   · Constipation seemed to have a lot of straining last pm with his bm. RECOMMENDATIONS:   · Wean O2  · Agree with Cardiac  Cath, Can be on blood thinners if needed. · Pt underwent a bronch that was non dx in 2019  · IV abx per attending MD  · Will add steroids  As well. Subjective:   Last 24 hrs: Had a tough time with BM last pm. He developed dizziness. Reports he sat on toilet for 45 minutes trying to have a BM. NO GERD. NO further hemoptysis since yesterday. NO wheezing. NO chest pain. He reports his main complaint as worsening dyspnea. Has more dyspnea with his bowling. Less endurance. Had recurrent dizziness that prompted him to go to ER.         --  This patient has been seen and evaluated at the request of Dr. Adore Hernandez for abnormal chest ct and hemoptysis for more than 1-2 years. Patient is a 70 y.o. male admitted with sob, hypoxia  Started on empiric abx  Today pt on isolation being ruled  out for covid      Past Medical History:   Diagnosis Date    Cancer [facility])     prostate    Elevated troponin 2022      Past Surgical History:   Procedure Laterality Date    HX ORTHOPAEDIC      spinal fusion      Prior to Admission medications    Medication Sig Start Date End Date Taking?  Authorizing Provider amLODIPine-benazepril (LOTREL) 10-40 mg per capsule Take 1 Capsule by mouth daily. Yes Provider, Historical   albuterol (PROAIR HFA) 90 mcg/actuation inhaler Take 1-2 Puffs by inhalation every four (4) hours as needed for Wheezing. 19  Yes Evelyn Oseguera PA   ferrous sulfate (IRON) 325 mg (65 mg iron) tablet Take  by mouth Daily (before breakfast). Yes Provider, Historical   multivitamin (ONE A DAY) tablet Take 1 Tab by mouth daily. Yes Other, MD Dillon   loratadine (CLARITIN) 10 mg tablet Take 10 mg by mouth. Patient not taking: Reported on 2022    Other, MD Dillon     No Known Allergies   Social History     Tobacco Use    Smoking status: Never Smoker    Smokeless tobacco: Never Used   Substance Use Topics    Alcohol use:  Yes     Alcohol/week: 5.0 standard drinks     Types: 6 Shots of liquor per week      Family History   Problem Relation Age of Onset    Lung Disease Mother     Cancer Father         Current Facility-Administered Medications   Medication Dose Route Frequency    aspirin chewable tablet 81 mg  81 mg Oral DAILY    atorvastatin (LIPITOR) tablet 40 mg  40 mg Oral QHS    metoprolol tartrate (LOPRESSOR) tablet 25 mg  25 mg Oral Q12H    sodium chloride (NS) flush 5-40 mL  5-40 mL IntraVENous Q8H    cefTRIAXone (ROCEPHIN) 1 g in 0.9% sodium chloride (MBP/ADV) 50 mL MBP  1 g IntraVENous Q24H    azithromycin (ZITHROMAX) 500 mg in 0.9% sodium chloride 250 mL (VIAL-MATE)  500 mg IntraVENous Q24H       Review of Systems:  A comprehensive review of systems was negative except for: Respiratory: positive for dyspnea on exertion    Objective:   Vital Signs:    Visit Vitals  /81   Pulse 86   Temp 98.5 °F (36.9 °C)   Resp 16   Ht 5' 8\" (1.727 m)   Wt 110.4 kg (243 lb 6.2 oz)   SpO2 91%   BMI 37.01 kg/m²       O2 Device: Nasal cannula   O2 Flow Rate (L/min): 6 l/min   Temp (24hrs), Av.5 °F (36.9 °C), Min:97.6 °F (36.4 °C), Max:99.2 °F (37.3 °C)       Intake/Output:   Last shift:      No intake/output data recorded. Last 3 shifts: 04/30 1901 - 05/02 0700  In: 730 [P.O.:480; I.V.:250]  Out: 1500 [Urine:1500]    Intake/Output Summary (Last 24 hours) at 5/2/2022 0930  Last data filed at 5/1/2022 1545  Gross per 24 hour   Intake 240 ml   Output 700 ml   Net -460 ml      Physical Exam:   General:  Alert, cooperative, no distress, appears stated age. Head:  Normocephalic, without obvious abnormality, atraumatic. Lungs: CTA anteriorly. NO wheezing, no rhonchi. Breathing comfortably. CV; Normal S1, 2. NO MRG  Abd: +BS, Soft, nt, nd. No HSM. Extrem: NO C, C, E.   Has a birthmark on his LUE. Chest wall:  No tenderness or deformity. No accessory muscle use                           Neurologic: Grossly nonfocal, motor seems to be intact. Psych: no overt, anxiety or depression. Data review:     Recent Results (from the past 24 hour(s))   GLUCOSE, POC    Collection Time: 05/01/22 11:52 PM   Result Value Ref Range    Glucose (POC) 139 (H) 65 - 117 mg/dL    Performed by Gato Mendez RN    CBC WITH AUTOMATED DIFF    Collection Time: 05/02/22  3:30 AM   Result Value Ref Range    WBC 12.1 (H) 4.1 - 11.1 K/uL    RBC 3.30 (L) 4.10 - 5.70 M/uL    HGB 9.3 (L) 12.1 - 17.0 g/dL    HCT 29.1 (L) 36.6 - 50.3 %    MCV 88.2 80.0 - 99.0 FL    MCH 28.2 26.0 - 34.0 PG    MCHC 32.0 30.0 - 36.5 g/dL    RDW 16.2 (H) 11.5 - 14.5 %    PLATELET 025 581 - 497 K/uL    MPV 9.6 8.9 - 12.9 FL    NRBC 0.3 (H) 0  WBC    ABSOLUTE NRBC 0.04 (H) 0.00 - 0.01 K/uL    NEUTROPHILS 82 (H) 32 - 75 %    LYMPHOCYTES 10 (L) 12 - 49 %    MONOCYTES 5 5 - 13 %    EOSINOPHILS 1 0 - 7 %    BASOPHILS 0 0 - 1 %    IMMATURE GRANULOCYTES 2 (H) 0.0 - 0.5 %    ABS. NEUTROPHILS 10.1 (H) 1.8 - 8.0 K/UL    ABS. LYMPHOCYTES 1.2 0.8 - 3.5 K/UL    ABS. MONOCYTES 0.6 0.0 - 1.0 K/UL    ABS. EOSINOPHILS 0.1 0.0 - 0.4 K/UL    ABS. BASOPHILS 0.0 0.0 - 0.1 K/UL    ABS. IMM.  GRANS. 0.2 (H) 0.00 - 0.04 K/UL    DF AUTOMATED     METABOLIC PANEL, BASIC    Collection Time: 05/02/22  3:30 AM   Result Value Ref Range    Sodium 137 136 - 145 mmol/L    Potassium 4.4 3.5 - 5.1 mmol/L    Chloride 104 97 - 108 mmol/L    CO2 27 21 - 32 mmol/L    Anion gap 6 5 - 15 mmol/L    Glucose 111 (H) 65 - 100 mg/dL    BUN 14 6 - 20 MG/DL    Creatinine 0.93 0.70 - 1.30 MG/DL    BUN/Creatinine ratio 15 12 - 20      GFR est AA >60 >60 ml/min/1.73m2    GFR est non-AA >60 >60 ml/min/1.73m2    Calcium 9.2 8.5 - 10.1 MG/DL       Imaging:  I have personally reviewed the patients radiographs and have reviewed the reports:  Chest ct reviewed 4-26-22:   THYROID: No nodule. MEDIASTINUM: No mass or lymphadenopathy. ZELDA: No mass or lymphadenopathy. THORACIC AORTA: No aneurysm. HEART: Normal in size. Mild coronary artery calcifications. ESOPHAGUS: No wall thickening or dilatation. TRACHEA/BRONCHI: Patent. PLEURA: No effusion or pneumothorax. LUNGS: Multilobar patchy groundglass opacification. UPPER ABDOMEN: Slightly nodular thickening of the bilateral adrenal glands is  unchanged compared to March 2019. Hyperdense cystic lesion of the upper pole of  the right kidney is also unchanged compared March 2019. BONES: No aggressive bone lesion or fracture.     IMPRESSION  1. Patchy multilobar airspace disease suspicious for Covid pneumonia. 2. No pulmonary embolism.         Kita Polo MD

## 2022-05-02 NOTE — CARDIO/PULMONARY
Cardiac Rehab Note: chart review/referral     Cardiac cath 5/2/22:  \"non-significant CAD\"    EF 55-60  on 4/29/22 per echo    Smoking history assessed. Patient is a non smoker.    Smoking Cessation Program link has not been added to the AVS.

## 2022-05-02 NOTE — PROGRESS NOTES
End of Shift Note    Bedside shift change report given to Dai Hooker RN (oncoming nurse) by Mateusz Hill (offgoing nurse). Report included the following information SBAR    Shift worked:  7P-7A     Shift summary and any significant changes:    VSS  2-6L NC, although pt reports no SOB  1 time episode- Dizzy, diaphoretic, L side numb while having BM - resolved within a few minutes  CXR- still shows PNA  NPO since MN for cardiac cath   Concerns for physician to address:  Pulmonology consult before cardiac cath      Zone phone for oncoming shift:   [phone]       Activity:  Activity Level: Up with Assistance  Number times ambulated in hallways past shift: 0  Number of times OOB to chair past shift: 5    Cardiac:   Cardiac Monitoring: Yes      Cardiac Rhythm: Sinus Rhythm,Sinus Tachy    Access:   Current line(s): PIV     Genitourinary:   Urinary status: voiding    Respiratory:   O2 Device: Nasal cannula  Chronic home O2 use?: NO  Incentive spirometer at bedside: YES       GI:  Last Bowel Movement Date: 04/28/22 - update: 5/1/22 BM  Current diet:  DIET NPO  DIET NPO Sips of Water with Meds  Passing flatus: YES  Tolerating current diet: YES       Pain Management:   Patient states pain is manageable on current regimen: YES    Skin:  Oscar Score: 23  Interventions: increase time out of bed    Patient Safety:  Fall Score:  Total Score: 1  Interventions: pt to call before getting OOB       Length of Stay:  Expected LOS: - - -  Actual LOS: [address]

## 2022-05-02 NOTE — PROGRESS NOTES
End of Shift Note    Bedside shift change report given to ximena (oncoming nurse) by Ean Pantoja (offgoing nurse). Report included the following information SBAR and Kardex    Shift worked:  7-7     Shift summary and any significant changes:     LHC, right radial, no intervention     Concerns for physician to address:  still requiring 5 L o2     Zone phone for oncoming shift:          Activity:  Activity Level: Up with Assistance  Number times ambulated in hallways past shift: 0  Number of times OOB to chair past shift: 1    Cardiac:   Cardiac Monitoring: Yes      Cardiac Rhythm: Sinus Rhythm    Access:   Current line(s): PIV     Genitourinary:   Urinary status: voiding    Respiratory:   O2 Device: Nasal cannula  Chronic home O2 use?: NO  Incentive spirometer at bedside: NO       GI:  Last Bowel Movement Date: 05/01/22  Current diet:  DIET ONE TIME MESSAGE  DIET ONE TIME MESSAGE  ADULT DIET Regular; 4 carb choices (60 gm/meal); Low Fat/Low Chol/High Fiber/MAYA; No Salt Added (3-4 gm)  DIET ONE TIME MESSAGE  Passing flatus: YES  Tolerating current diet: YES       Pain Management:   Patient states pain is manageable on current regimen: YES    Skin:  Oscar Score: 23  Interventions: increase time out of bed    Patient Safety:  Fall Score:  Total Score: 0  Interventions: gripper socks       Length of Stay:  Expected LOS: - - -  Actual LOS: [address] [facility]

## 2022-05-02 NOTE — PROGRESS NOTES
[facility]  [facility]  [address]  [phone]  WWW. Immediately  CARDIOLOGY PROGRESS NOTE    5/2/2022 12:25 PM    Admit Date: 4/28/2022    Admit Diagnosis:   CAP (community acquired pneumonia) [J18.9]  Sepsis ([address] [A41.9]  Hemoptysis [R04.2]    Subjective:     Emmie Chaudhry Sr. is s/p cardiac cath. No cardiac complaints, remains DUNBAR, on 4L/NC.     VSS. Labs stable.      Visit Vitals  /71   Pulse 72   Temp 97.9 °F (36.6 °C)   Resp 16   Ht 5' 8\" (1.727 m)   Wt 110.4 kg (243 lb 6.2 oz)   SpO2 90%   BMI 37.01 kg/m²       Current Facility-Administered Medications   Medication Dose Route Frequency    aspirin chewable tablet 81 mg  81 mg Oral DAILY    atorvastatin (LIPITOR) tablet 40 mg  40 mg Oral QHS    metoprolol tartrate (LOPRESSOR) tablet 25 mg  25 mg Oral Q12H    sodium chloride (NS) flush 5-40 mL  5-40 mL IntraVENous Q8H    sodium chloride (NS) flush 5-40 mL  5-40 mL IntraVENous PRN    acetaminophen (TYLENOL) tablet 650 mg  650 mg Oral Q6H PRN    Or    acetaminophen (TYLENOL) suppository 650 mg  650 mg Rectal Q6H PRN    polyethylene glycol (MIRALAX) packet 17 g  17 g Oral DAILY PRN    ondansetron (ZOFRAN ODT) tablet 4 mg  4 mg Oral Q8H PRN    Or    ondansetron (ZOFRAN) injection 4 mg  4 mg IntraVENous Q6H PRN    cefTRIAXone (ROCEPHIN) 1 g in 0.9% sodium chloride (MBP/ADV) 50 mL MBP  1 g IntraVENous Q24H    azithromycin (ZITHROMAX) 500 mg in 0.9% sodium chloride 250 mL (VIAL-MATE)  500 mg IntraVENous Q24H         Objective:      Physical Exam  General Appearance:  obese older AAM in no acute distress; alert, cooperative, well nourished, well developed; appears stated age  Eyes: sclera anicteric  Mouth/Throat: moist mucous membranes; oral pharynx clear  Neck: supple; no JVD or bruit  Pulmonary:  clear to auscultation bilaterally; good effort  Cardiovascular: regular rate and rhythm; no murmur, click, rub, or gallop  Abdomen: soft, non-tender, non-distended; bowel sounds normal  Musculoskeletal: no swelling or deformity; moves all extremities  Extremities: no edema; palpable distal pulses; right radial site CDI    Skin: warm and dry  Neuro: grossly normal  Psych: normal mood and affect given the setting           Data Review:   Recent Labs     05/02/22  0330 05/01/22  0400 04/30/22  0236   WBC 12.1* 11.3* 10.4   HGB 9.3* 8.8* 9.2*   HCT 29.1* 28.7* 28.8*    278 260     Recent Labs     05/02/22  0330 05/01/22  0400    138   K 4.4 4.3    106   CO2 27 27   * 104*   BUN 14 10   CREA 0.93 0.80   CA 9.2 8.9       No results for input(s): TROIQ, CPK, CKMB in the last 72 hours. Intake/Output Summary (Last 24 hours) at 5/2/2022 1225  Last data filed at 5/1/2022 1545  Gross per 24 hour   Intake --   Output 300 ml   Net -300 ml      Cardiographics     Telemetry: SR     ECG: SR with TWave inversion ant/lat     Echocardiogram: 4/29/2022     Left Ventricle: Normal left ventricular systolic function with a visually estimated EF of 55 - 60%. Left ventricle size is normal. Normal wall thickness. Normal wall motion. Normal diastolic function.   Contrast used: Definity. CXRAY:  \"Bilateral airspace infiltrates again suspicious for pneumonia,  likely Covid, in appropriate clinical setting. \"    Respiratory panel negative for Covid     Assessment:     Active Problems:    CAP (community acquired pneumonia) (4/28/2022)      Sepsis ([address] (4/28/2022)      Hemoptysis (4/28/2022)      Elevated troponin (4/29/2022)        Plan:   Elevated troponin/Type 2 MI:  ECG changes and elevated troponin, peak 153 down to 95   Multiple risk factors s/p negative cardiac cath today  Continue ASA, metoprolol 25mg BID, lipitor. Echo WNL  LDL 75.8     HTN:  Patient PTA meds amlodipine/benazipril not started, and will continue hold for now.    Continue BB      CAP:  Per Hospitalist and pulmonary  Remains on 4L/NC, wean as able     Cardiology will follow as needed. Will need to follow with [facility] as outpatient.        Jada Ricketts, JUANCARLOS   DNP,APRN,AG-ACNP-BC

## 2022-05-02 NOTE — PROGRESS NOTES
Received notification from bedside RN about patient with regards to: episode of desaturation diaphoresis and dizziness while using bathroom, symptom resolved shortly after but now needing 5 L NC to keep O2 >90%  VS: /65, HR 85, RR 18, O2 sat 90% on NC 2 L    Intervention given: CXR ordered

## 2022-05-03 LAB
ANION GAP SERPL CALC-SCNC: 4 MMOL/L (ref 5–15)
BACTERIA SPEC CULT: NORMAL
BASOPHILS # BLD: 0 K/UL (ref 0–0.1)
BASOPHILS NFR BLD: 0 % (ref 0–1)
BUN SERPL-MCNC: 14 MG/DL (ref 6–20)
BUN/CREAT SERPL: 19 (ref 12–20)
CALCIUM SERPL-MCNC: 9 MG/DL (ref 8.5–10.1)
CHLORIDE SERPL-SCNC: 105 MMOL/L (ref 97–108)
CO2 SERPL-SCNC: 27 MMOL/L (ref 21–32)
CREAT SERPL-MCNC: 0.75 MG/DL (ref 0.7–1.3)
DIFFERENTIAL METHOD BLD: ABNORMAL
EOSINOPHIL # BLD: 0.1 K/UL (ref 0–0.4)
EOSINOPHIL NFR BLD: 1 % (ref 0–7)
ERYTHROCYTE [DISTWIDTH] IN BLOOD BY AUTOMATED COUNT: 16 % (ref 11.5–14.5)
GLUCOSE SERPL-MCNC: 109 MG/DL (ref 65–100)
HCT VFR BLD AUTO: 27.5 % (ref 36.6–50.3)
HGB BLD-MCNC: 8.8 G/DL (ref 12.1–17)
IMM GRANULOCYTES # BLD AUTO: 0.1 K/UL (ref 0–0.04)
IMM GRANULOCYTES NFR BLD AUTO: 1 % (ref 0–0.5)
LYMPHOCYTES # BLD: 1.1 K/UL (ref 0.8–3.5)
LYMPHOCYTES NFR BLD: 11 % (ref 12–49)
MCH RBC QN AUTO: 28 PG (ref 26–34)
MCHC RBC AUTO-ENTMCNC: 32 G/DL (ref 30–36.5)
MCV RBC AUTO: 87.6 FL (ref 80–99)
MONOCYTES # BLD: 0.5 K/UL (ref 0–1)
MONOCYTES NFR BLD: 6 % (ref 5–13)
NEUTS SEG # BLD: 7.6 K/UL (ref 1.8–8)
NEUTS SEG NFR BLD: 81 % (ref 32–75)
NRBC # BLD: 0 K/UL (ref 0–0.01)
NRBC BLD-RTO: 0 PER 100 WBC
PLATELET # BLD AUTO: 284 K/UL (ref 150–400)
PMV BLD AUTO: 9.3 FL (ref 8.9–12.9)
POTASSIUM SERPL-SCNC: 4.6 MMOL/L (ref 3.5–5.1)
RBC # BLD AUTO: 3.14 M/UL (ref 4.1–5.7)
SERVICE CMNT-IMP: NORMAL
SODIUM SERPL-SCNC: 136 MMOL/L (ref 136–145)
WBC # BLD AUTO: 9.4 K/UL (ref 4.1–11.1)

## 2022-05-03 PROCEDURE — 74011250637 HC RX REV CODE- 250/637: Performed by: STUDENT IN AN ORGANIZED HEALTH CARE EDUCATION/TRAINING PROGRAM

## 2022-05-03 PROCEDURE — 74011000250 HC RX REV CODE- 250: Performed by: INTERNAL MEDICINE

## 2022-05-03 PROCEDURE — 74011636637 HC RX REV CODE- 636/637: Performed by: INTERNAL MEDICINE

## 2022-05-03 PROCEDURE — 80048 BASIC METABOLIC PNL TOTAL CA: CPT

## 2022-05-03 PROCEDURE — 74011250637 HC RX REV CODE- 250/637: Performed by: NURSE PRACTITIONER

## 2022-05-03 PROCEDURE — 36415 COLL VENOUS BLD VENIPUNCTURE: CPT

## 2022-05-03 PROCEDURE — 77010033678 HC OXYGEN DAILY

## 2022-05-03 PROCEDURE — 74011250636 HC RX REV CODE- 250/636: Performed by: INTERNAL MEDICINE

## 2022-05-03 PROCEDURE — 65270000046 HC RM TELEMETRY

## 2022-05-03 PROCEDURE — 85025 COMPLETE CBC W/AUTO DIFF WBC: CPT

## 2022-05-03 RX ORDER — PREDNISONE 20 MG/1
20 TABLET ORAL
Status: DISCONTINUED | OUTPATIENT
Start: 2022-05-04 | End: 2022-05-04 | Stop reason: HOSPADM

## 2022-05-03 RX ADMIN — SODIUM CHLORIDE, PRESERVATIVE FREE 10 ML: 5 INJECTION INTRAVENOUS at 21:42

## 2022-05-03 RX ADMIN — SODIUM CHLORIDE, PRESERVATIVE FREE 10 ML: 5 INJECTION INTRAVENOUS at 06:31

## 2022-05-03 RX ADMIN — PREDNISONE 40 MG: 20 TABLET ORAL at 08:26

## 2022-05-03 RX ADMIN — METOPROLOL TARTRATE 25 MG: 25 TABLET ORAL at 08:26

## 2022-05-03 RX ADMIN — AZITHROMYCIN MONOHYDRATE 500 MG: 500 INJECTION, POWDER, LYOPHILIZED, FOR SOLUTION INTRAVENOUS at 17:38

## 2022-05-03 RX ADMIN — METOPROLOL TARTRATE 25 MG: 25 TABLET ORAL at 21:42

## 2022-05-03 RX ADMIN — ATORVASTATIN CALCIUM 40 MG: 40 TABLET, FILM COATED ORAL at 21:42

## 2022-05-03 RX ADMIN — ASPIRIN 81 MG: 81 TABLET, CHEWABLE ORAL at 08:26

## 2022-05-03 NOTE — PROGRESS NOTES
Hospitalist Progress Note    NAME: Gayle Matt Sr.   :  1950   MRN:  [id number]       Assessment / Plan:  Community-acquired pneumonia  Acute hypoxic respiratory failure secondary to above  Severe sepsis  Hemoptysis chronic (about 4 years per patient)     -Reports hemoptysis x 4 years (intermittent) and has seen Dr. Janeal Goldberg on outpatient basis. Had diagnostic bronchoscopy in 2019. Bronchial washings were negative for AFB, cultures were also negative including fungus. Cytology was negative for malignancy.     -CT showed evidence of bilateral patchy pneumonia.  Lactic is elevated at 2.16 and is tachycardic and has leukocytosis  -Rapid COVID is negative. Respiratory viral panel negative  -Procalcitonin normal  -Blood culture NGTD  -Continue empiric Rocephin and azithromycin. C/w steroids per pulmonary  Appreciate pulm's recs  Today he is weaned to 2 liters oxygen, oxygen challenge test today. Possible d/c tomorrow.     Troponin elevation likely type II non-STEMI   -Denies chest pain  Troponin 153 > 92  -EKG normal sinus rhythm with nonspecific ST-T wave changes  -Echocardiogram EF 55 to 60%, normal wall motion  -Stress test showed distal inferior reversibility suggesting ischemia. S/p cath on : NO significant disease n Cont' ASA,  BB, statin  -Cardiology following     ? 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 Counseling on diet, exercise, and weight loss     Estimated discharge date:   Barriers: oxygen challlenge     Code Status: Full code  Surrogate Decision Maker: Wife Fabian Umaña     DVT Prophylaxis: SCDs due to hemoptysis  GI Prophylaxis: not indicated     Baseline: From home, independent of ADLs  BELINDA:         Subjective:     Chief Complaint / Reason for Physician Visit  Follow up for pneumonia  He feels a whole lot better.  He is pulling 2 liters on spirometry    Review of Systems:  Symptom Y/N Comments Symptom Y/N Comments   Fever/Chills    Chest Pain     Poor Appetite    Edema     Cough    Abdominal Pain     Sputum    Joint Pain     SOB/DUNBAR    Pruritis/Rash     Nausea/vomit    Tolerating PT/OT     Diarrhea    Tolerating Diet     Constipation    Other       Could NOT obtain due to:      Objective:     VITALS:   Last 24hrs VS reviewed since prior progress note. Most recent are:  Patient Vitals for the past 24 hrs:   Temp Pulse Resp BP SpO2   05/03/22 0715 97.7 °F (36.5 °C) 69 16 124/73 93 %   05/03/22 0430 98 °F (36.7 °C) 74 18 120/73 95 %   05/02/22 2245 97.6 °F (36.4 °C) 81 20 (!) 140/80 93 %   05/02/22 1917 98.2 °F (36.8 °C) 87 16 (!) 152/84 94 %   05/02/22 1500 98.4 °F (36.9 °C) 87 -- (!) 128/56 --   05/02/22 1430 -- 80 -- (!) 103/92 --   05/02/22 1402 -- 83 -- 117/66 --   05/02/22 1330 -- 81 -- 114/65 --   05/02/22 1315 -- 79 -- 127/65 --   05/02/22 1300 -- 83 -- 135/66 --   05/02/22 1245 -- 82 -- (!) 130/59 --   05/02/22 1225 -- 80 -- (!) 115/51 --   05/02/22 1220 -- 77 -- 126/72 --   05/02/22 1215 -- 82 -- 123/66 --   05/02/22 1210 -- 79 -- 126/83 --   05/02/22 1205 -- 80 -- 139/70 --   05/02/22 1200 -- 77 -- 130/70 --   05/02/22 1155 -- 72 -- 123/71 --   05/02/22 1152 -- 71 -- 134/71 --       Intake/Output Summary (Last 24 hours) at 5/3/2022 1045  Last data filed at 5/3/2022 0715  Gross per 24 hour   Intake 300 ml   Output 800 ml   Net -500 ml        I had a face to face encounter and independently examined this patient on 5/3/2022, as outlined below:  PHYSICAL EXAM:  General: Awake, No acute distress  EENT:  EOMI. Anicteric sclerae. MMM  Resp:  CTA bilaterally, no wheezing or rales. No accessory muscle use  CV:  Regular  rhythm,  No edema  GI:  Soft, Non distended, Non tender. +Bowel sounds  Neurologic:  Alert and oriented X 3, normal speech,   Psych:   Not anxious nor agitated  Skin:  No rashes.   No jaundice    Reviewed most current lab test results and cultures  YES  Reviewed most current radiology test results   YES  Review and summation of old records today    NO  Reviewed patient's current orders and MAR    YES  PMH/SH reviewed - no change compared to H&P  ________________________________________________________________________  Care Plan discussed with:    Comments   Patient y    Family      RN y    Care Manager     Consultant                        Multidiciplinary team rounds were held today with , nursing, pharmacist and clinical coordinator. Patient's plan of care was discussed; medications were reviewed and discharge planning was addressed. ________________________________________________________________________  Total NON critical care TIME: 37  Minutes    Total CRITICAL CARE TIME Spent:   Minutes non procedure based      Comments   >50% of visit spent in counseling and coordination of care     ________________________________________________________________________  [place] Castleman, MD     Procedures: see electronic medical records for all procedures/Xrays and details which were not copied into this note but were reviewed prior to creation of Plan. LABS:  I reviewed today's most current labs and imaging studies.   Pertinent labs include:  Recent Labs     05/03/22 0422 05/02/22  0330 05/01/22  0400   WBC 9.4 12.1* 11.3*   HGB 8.8* 9.3* 8.8*   HCT 27.5* 29.1* 28.7*    299 278     Recent Labs     05/03/22  0422 05/02/22  0330 05/01/22  0400    137 138   K 4.6 4.4 4.3    104 106   CO2 27 27 27   * 111* 104*   BUN 14 14 10   CREA 0.75 0.93 0.80   CA 9.0 9.2 8.9       Signed: Dutch Castleman, MD

## 2022-05-03 NOTE — PROGRESS NOTES
Bedside shift change report given to Vane Gallardo RN (oncoming nurse) by Genny Romano (offgoing nurse). Report included the following information SBAR and Kardex     Shift worked:  7P-7A      Shift summary and any significant changes:      4L NC overnight   No CP  Right radial site CDI       Concerns for physician to address:  Plan for continued PNA treatment, still requiring oxygen      Zone phone for oncoming shift:   [phone]         Activity:  Activity Level: Up with Assistance  Number times ambulated in hallways past shift: 0  Number of times OOB to chair past shift: 4     Cardiac:   Cardiac Monitoring: Yes      Cardiac Rhythm: Sinus Rhythm     Access:   Current line(s): PIV      Genitourinary:   Urinary status: voiding     Respiratory:   O2 Device: Nasal cannula  Chronic home O2 use?: NO  Incentive spirometer at bedside: NO     GI:  Last Bowel Movement Date: 05/02/22  Current diet:  DIET ONE TIME MESSAGE  DIET ONE TIME MESSAGE  ADULT DIET Regular; 4 carb choices (60 gm/meal); Low Fat/Low Chol/High Fiber/MAYA; No Salt Added (3-4 gm)  DIET ONE TIME MESSAGE  Passing flatus: YES  Tolerating current diet: YES     Pain Management:   Patient states pain is manageable on current regimen: YES     Skin:  Oscar Score: 23  Interventions: increase time out of bed    Patient Safety:  Fall Score:  Total Score: 0  Interventions: gripper socks     Length of Stay:  Expected LOS: - - -  Actual LOS: 5        Latia Barbour

## 2022-05-03 NOTE — PROGRESS NOTES
Pulse oximetry assessment   93% at rest on room air (if 88% or less, skip next steps)  84% while ambulating on room air  95% at rest on 2LPM  90% while ambulating on 3LPM      Pt ambulated in marr for oxygen challenge. Pt denies SOB however SpO2 84%, pt placed on 2L O2    Pt ambulated in marr second time on 2L. SpO2 87% increased to 3L. Returned to 2L once recovered on edge of bed. Pt continues to deny SOB.

## 2022-05-04 VITALS
HEART RATE: 81 BPM | DIASTOLIC BLOOD PRESSURE: 77 MMHG | WEIGHT: 243.39 LBS | HEIGHT: 68 IN | OXYGEN SATURATION: 98 % | BODY MASS INDEX: 36.89 KG/M2 | SYSTOLIC BLOOD PRESSURE: 132 MMHG | RESPIRATION RATE: 16 BRPM | TEMPERATURE: 97 F

## 2022-05-04 PROCEDURE — 74011636637 HC RX REV CODE- 636/637: Performed by: INTERNAL MEDICINE

## 2022-05-04 PROCEDURE — 74011000250 HC RX REV CODE- 250: Performed by: INTERNAL MEDICINE

## 2022-05-04 PROCEDURE — 74011250637 HC RX REV CODE- 250/637: Performed by: STUDENT IN AN ORGANIZED HEALTH CARE EDUCATION/TRAINING PROGRAM

## 2022-05-04 PROCEDURE — 77010033678 HC OXYGEN DAILY

## 2022-05-04 RX ORDER — PREDNISONE 10 MG/1
10 TABLET ORAL
Qty: 30 TABLET | Refills: 0 | Status: SHIPPED | OUTPATIENT
Start: 2022-05-05 | End: 2022-06-04

## 2022-05-04 RX ORDER — GUAIFENESIN 100 MG/5ML
81 LIQUID (ML) ORAL DAILY
Qty: 30 TABLET | Refills: 0 | Status: SHIPPED | OUTPATIENT
Start: 2022-05-05 | End: 2022-06-04

## 2022-05-04 RX ORDER — DOXYCYCLINE 100 MG/1
100 CAPSULE ORAL 2 TIMES DAILY
Qty: 8 CAPSULE | Refills: 0 | Status: SHIPPED | OUTPATIENT
Start: 2022-05-04 | End: 2022-05-08

## 2022-05-04 RX ORDER — ATORVASTATIN CALCIUM 40 MG/1
40 TABLET, FILM COATED ORAL
Qty: 30 TABLET | Refills: 1 | Status: SHIPPED | OUTPATIENT
Start: 2022-05-04 | End: 2022-07-03

## 2022-05-04 RX ADMIN — ASPIRIN 81 MG: 81 TABLET, CHEWABLE ORAL at 08:43

## 2022-05-04 RX ADMIN — SODIUM CHLORIDE, PRESERVATIVE FREE 10 ML: 5 INJECTION INTRAVENOUS at 06:40

## 2022-05-04 RX ADMIN — PREDNISONE 20 MG: 20 TABLET ORAL at 08:43

## 2022-05-04 NOTE — PROGRESS NOTES
Transition of Care Plan:    RUR:7% low risk   Disposition: home   Follow up appointments: PCP and cardiology   DME needed: O2 tank ordered and delivered   Transportation at Discharge: spouse   Keys or means to access home:   Pt has access      IM Medicare Letter: received   Is patient a BCPI-A Bundle: N/a          If yes, was Bundle Letter given?:  N/a  Is patient a [employer] and connected with the [facility]? N/a              If yes, was [employer] transfer form completed and [facility] notified? Caregiver Contact:LinaIsidoroLavonalisson (Spouse)   [phone]  Discharge Caregiver contacted prior to discharge? Spouse is at the bedside   Care Conference needed?:  no                   CM notified of Pt needing O2 ordered. Pt did not have a qualifying dx for O2, but was willing to pay out of pocket for O2. CM was informed by [facility] the O2 would cost the Pt $105 per month +$10 per tank. CM notified Pt, Pt was agreeable. O2 tank delivered to the bedside.        Hugh Graf, KEO Montanez Walton

## 2022-05-04 NOTE — PROGRESS NOTES
End of Shift Note    Bedside shift change report given to Chrissy Catherine RN (oncoming nurse) by Elmira Thorpe (offgoing nurse). Report included the following information SBAR    Shift worked:  7P-7A     Shift summary and any significant changes:    Pt occasionally going into a 2nd degree type 2 block and then self converting back into NSR with a 1st degree block. Pt is asx, BP stable. Pt reports no CP, palpitations, or discomfort. Dicussed patient presentation with Rapid response RN, Jazmine Encinas, and NP, Ludmila Segovia. Metoprolol 25mg BID on hold. Cardiac MD, Camilla Monte, on call paged. Concerns for physician to address:  Concern for hear block. Home O2 for PNA     Zone phone [phone]     Activity:  Activity Level: Up ad glen  Number times ambulated in hallways past shift: 1  Number of times OOB to chair past shift: 4    Cardiac:   Cardiac Monitoring: Yes      Cardiac Rhythm: Sinus Rhythm    Access:   Current line(s): PIV     Genitourinary:   Urinary status: voiding    Respiratory:   O2 Device: Nasal cannula  Chronic home O2 use?: NO  Incentive spirometer at bedside: YES  Actual Volume (ml): 2250 ml    GI:  Last Bowel Movement Date: 05/02/22  Current diet:  DIET ONE TIME MESSAGE  DIET ONE TIME MESSAGE  ADULT DIET Regular; 4 carb choices (60 gm/meal); Low Fat/Low Chol/High Fiber/MAYA; No Salt Added (3-4 gm)  DIET ONE TIME MESSAGE  Passing flatus: YES  Tolerating current diet: YES       Pain Management:   Patient states pain is manageable on current regimen: YES    Skin:  Oscar Score: 23  Interventions: increase time out of bed    Patient Safety:  Fall Score:  Total Score: 1  Interventions: pt to call before getting OOB       Length of Stay:  Expected LOS: - - -  Actual LOS: [facility]

## 2022-05-04 NOTE — PROGRESS NOTES
Physician Progress Note      PATIENT:               April Chappell  CSN #:                  [id number]  :                       1950  ADMIT DATE:       2022 5:24 PM  [address] [place] DATE:  RESPONDING  PROVIDER #:        Rocky Diez MD          QUERY TEXT:    Dr Bety Monaco  Patient admitted with pneumonia, AHRF. Noted documentation of severe sepsis . In order to support the diagnosis of sepsis, please include additional clinical indicators in your documentation. Or please document if the diagnosis of sepsis has been ruled out after further study. The medical record reflects the following:  Risk Factors: presents with pneumonia, hemoptysis, AHRF, bone marrow biopsy suggesting non-IgM MGUS  Clinical Indicators: noted pneumonia, WBC 15-14, lactic acid 2.16-1.0, respiratory rate 27-23, afebrile  Treatment: 1.2 liters NS bolus, Zithromax, Rocephin  Options provided:  -- Sever Sepsis  present as evidenced by, Please document evidence. -- Sepsis was ruled out after study  -- Other - I will add my own diagnosis  -- Disagree - Not applicable / Not valid  -- Disagree - Clinically unable to determine / Unknown  -- Refer to Clinical Documentation Reviewer    PROVIDER RESPONSE TEXT:    Severe Sepsis is present as evidenced by WBC 15-14, lactic acid 2.16-1.0, respiratory rate 27-23, afebrile    Query created by:  Boo Shell on 2022 10:31 AM      Electronically signed by:  Rocky Diez MD 2022 5:01 PM

## 2022-05-04 NOTE — PROGRESS NOTES
Hospital follow-up PCP transitional care appointment has been scheduled with JUANCARLOS Ramirez on 5/6/22 at [phone]. Pending patient discharge.   Klarissa Holloway, Care Management Assistant

## 2022-05-04 NOTE — PROGRESS NOTES
Spiritual Care Assessment/Progress Note  [facility]      NAME: Christi Carr Sr. MRN: [id number]  AGE: 70 y.o.  SEX: male  Faith Affiliation: Sikhism   Language: English     5/4/2022     Total Time (in minutes): 16     Spiritual Assessment begun in [facility] 2 [facility]TR[facility] CARDIO through conversation with:         [x]Patient        [] Family    [] Friend(s)        Reason for Consult: Initial/Spiritual assessment, patient floor     Spiritual beliefs: (Please include comment if needed)     [x] Identifies with a rose marie tradition:         [] Supported by a rose marie community:            [] Claims no spiritual orientation:           [] Seeking spiritual identity:                [] Adheres to an individual form of spirituality:           [] Not able to assess:                           Identified resources for coping:      [x] Prayer                               [] Music                  [] Guided Imagery     [x] Family/friends                 [] Pet visits     [] Devotional reading                         [] Unknown     [] Other:                                         Interventions offered during this visit: (See comments for more details)    Patient Interventions: Affirmation of emotions/emotional suffering,Affirmation of rose marie,Catharsis/review of pertinent events in supportive environment,Iconic (affirming the presence of God/Higher Power),Initial/Spiritual assessment, patient floor,Prayer (assurance of),Faith beliefs/image of God discussed           Plan of Care:     [] Support spiritual and/or cultural needs    [] Support AMD and/or advance care planning process      [] Support grieving process   [] Coordinate Rites and/or Rituals    [] Coordination with community clergy   [x] No spiritual needs identified at this time   [] Detailed Plan of Care below (See Comments)  [] Make referral to Music Therapy  [] Make referral to Pet Therapy     [] Make referral to Addiction services  [] Make referral to Sacred Passages  [] Make referral to Spiritual Care Partner  [] No future visits requested        [x] Contact Spiritual Care for further referrals     Comments:   Reviewed chart prior to visit on [facility] for spiritual assessment. No family/friends present. Patient was seated in recliner appearing to be in good spirits and was welcoming of visit. Offered supportive listening presence as he spoke about his medical concerns and the progress he has made during hospitalization. He has good support from his family; his son visited earlier this morning and he was finishing up a phone call with a loved one when  arrived for visit. He demonstrated his progress using the spirometer and appears committed to following instructions to using it several times an hour. Affirmed his progress and positive outlook. He is expecting to be discharged late this afternoon or early evening. No spiritual needs or concerns were expressed. Staff arrived to walk with him as he ambulates in marr. Offered assurance of prayer. Patient expressed appreciation for visit.      BOGDAN Shook, [facility], Staff [address]    [address] Paging Service  [phone]

## 2022-05-04 NOTE — PROGRESS NOTES
Pt ambulated in marr on 3L. No complaints. SpO2 85-90%    Discharge instructions discussed with patient. All questions answered. Patient verbalized understanding. Cath site CDI, no hematoma. Care instructions and restrictions reviewed. Patient instructed to make follow up appts per discharge instructions. Patient signed discharge instructions after reviewing them and duplicate copy placed in chart. Belongings gathered and accounted for. Telemetry monitor and IV removed. Tolerating ambulation in room and hallway. Denies CP, SOB, or dizziness. Escorted out via w/c, discharged home with family in a private vehicle with home O2.

## 2022-05-04 NOTE — PROGRESS NOTES
Pulmonary, Critical Care, and Sleep Medicine    Patient Consult    Name: Ivett Aguero. MRN: [id number]   : 1950 Hospital: [facility]   Date: 2022        IMPRESSION:   · Acute respiratory failure, has improved but will need home oxygen. This has been ordered. · Acute Pneumonia: on Doxy. 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 NO intervention has been needed up to this point. IF the hemoptysis returns would refer to [facility] for Laser Rx of the mucosal abnormalities. · Hemoptysis seems worse recently, May be due to acute infection due to CAP. Has been much better for last several days. ·   · Agree with Cardiology doing Cath, can be on blood thinners if needed. Discussed with Dr. Howard Martinez this am.   · Constipation seemed to have a lot of straining last pm with his bm. RECOMMENDATIONS:   · Ordered home oxygen. · He will seen Dr. Morelia Limon for ENT consult as an outpt. · Pt underwent a bronch that was non dx in 2019  · Doxycycline 100mg po bid to complete total of 10 days of therapy. · Decreased prednisone to 20mg po every day. Can go to 10mg po every day until he sees me. · He will seen me in office in 2 weeks. Subjective:   Last 24 hrs:   Discussed that he had some slow heart beats. Not much sputum production. NO chest pain. No back pain. Has exertional dyspnea. Needs home oxygen. Has minimal cough  Pt has been feeling pretty good. NO chest pain. No back pain. He has been doing much better with his IS. He feels ready to go home. Slept ok last pm. No aspiration issues. 22:   Had a tough time with BM last pm. He developed dizziness. Reports he sat on toilet for 45 minutes trying to have a BM. NO GERD. NO further hemoptysis since yesterday. NO wheezing. NO chest pain. He reports his main complaint as worsening dyspnea. Has more dyspnea with his bowling. Less endurance.  Had recurrent dizziness that prompted him to go to ER.         --  This patient has been seen and evaluated at the request of Dr. Chelo Hoffmann for abnormal chest ct and hemoptysis for more than 1-2 years. Patient is a 70 y.o. male admitted with sob, hypoxia  Started on empiric abx  Today pt on isolation being ruled  out for covid      Past Medical History:   Diagnosis Date    Cancer [facility])     prostate    Elevated troponin 4/29/2022      Past Surgical History:   Procedure Laterality Date    HX ORTHOPAEDIC  1999    spinal fusion      Prior to Admission medications    Medication Sig Start Date End Date Taking? Authorizing Provider   amLODIPine-benazepril (LOTREL) 10-40 mg per capsule Take 1 Capsule by mouth daily. Yes Provider, Historical   albuterol (PROAIR HFA) 90 mcg/actuation inhaler Take 1-2 Puffs by inhalation every four (4) hours as needed for Wheezing. 12/27/19  Yes Evelyn Oseguera PA   ferrous sulfate (IRON) 325 mg (65 mg iron) tablet Take  by mouth Daily (before breakfast). Yes Provider, Historical   multivitamin (ONE A DAY) tablet Take 1 Tab by mouth daily. Yes Other, MD Dillon   loratadine (CLARITIN) 10 mg tablet Take 10 mg by mouth. Patient not taking: Reported on 4/29/2022    Other, MD Dillon     No Known Allergies   Social History     Tobacco Use    Smoking status: Never Smoker    Smokeless tobacco: Never Used   Substance Use Topics    Alcohol use:  Yes     Alcohol/week: 5.0 standard drinks     Types: 6 Shots of liquor per week      Family History   Problem Relation Age of Onset    Lung Disease Mother     Cancer Father         Current Facility-Administered Medications   Medication Dose Route Frequency    predniSONE (DELTASONE) tablet 20 mg  20 mg Oral DAILY WITH BREAKFAST    aspirin chewable tablet 81 mg  81 mg Oral DAILY    atorvastatin (LIPITOR) tablet 40 mg  40 mg Oral QHS    sodium chloride (NS) flush 5-40 mL  5-40 mL IntraVENous Q8H    azithromycin (ZITHROMAX) 500 mg in 0.9% sodium chloride 250 mL (VIAL-MATE)  500 mg IntraVENous Q24H       Review of Systems:  A comprehensive review of systems was negative except for: Respiratory: positive for dyspnea on exertion    Objective:   Vital Signs:    Visit Vitals  BP (!) 143/67 (BP 1 Location: Left upper arm, BP Patient Position: At rest)   Pulse 66   Temp 97.7 °F (36.5 °C)   Resp 18   Ht 5' 8\" (1.727 m)   Wt 110.4 kg (243 lb 6.2 oz)   SpO2 93%   BMI 37.01 kg/m²       O2 Device: Nasal cannula   O2 Flow Rate (L/min): 2 l/min   Temp (24hrs), Av.1 °F (36.7 °C), Min:97.7 °F (36.5 °C), Max:98.4 °F (36.9 °C)       Intake/Output:   Last shift:      No intake/output data recorded. Last 3 shifts:  1901 -  0700  In: 1400 [P.O.:1400]  Out: 400 [Urine:400]    Intake/Output Summary (Last 24 hours) at 2022 1100  Last data filed at 5/3/2022 1743  Gross per 24 hour   Intake 1100 ml   Output --   Net 1100 ml      Physical Exam:   General:  Alert, cooperative, no distress, appears stated age. On NC oxygen. Head:  Normocephalic, without obvious abnormality, atraumatic. Lungs: CTA anteriorly. NO wheezing, no rhonchi. Breathing is comfortable. CV; Normal S1, 2. NO MRG  Abd: +BS, Soft, nt, nd. No HSM. Extrem: NO C, C, E.   Has a birthmark on his LUE. Chest wall:  No tenderness or deformity. No accessory muscle use                           Neurologic: Grossly nonfocal, motor seems to be intact. Psych: no overt, anxiety or depression. Data review:     No results found for this or any previous visit (from the past 24 hour(s)). Imaging:  I have personally reviewed the patients radiographs and have reviewed the reports:  Chest ct reviewed 22:   THYROID: No nodule. MEDIASTINUM: No mass or lymphadenopathy. ZELDA: No mass or lymphadenopathy. THORACIC AORTA: No aneurysm. HEART: Normal in size. Mild coronary artery calcifications. ESOPHAGUS: No wall thickening or dilatation. TRACHEA/BRONCHI: Patent.   PLEURA: No effusion or pneumothorax. LUNGS: Multilobar patchy groundglass opacification. UPPER ABDOMEN: Slightly nodular thickening of the bilateral adrenal glands is  unchanged compared to March 2019. Hyperdense cystic lesion of the upper pole of  the right kidney is also unchanged compared March 2019. BONES: No aggressive bone lesion or fracture.     IMPRESSION  1. Patchy multilobar airspace disease suspicious for Covid pneumonia. 2. No pulmonary embolism.         Kelley Gardner MD

## 2022-05-04 NOTE — PROGRESS NOTES
1312: THOM Kaminski discussed case with Cardiology NP. Noted on telemetry the patient had episodes of second degree HB. He was on BB. Cardiac cath showed no significant CAD, should avoid negative chronotropic agents in this patient. BB DC'd. Resume his amlodipine-benazepril 10-40 daily at discharge for his HTN management.      Burt SANCHEZ,HN

## 2022-05-04 NOTE — PROGRESS NOTES
80 Spoke to primary RN Latia during RRT rounding. Discussed patient heart rhythm transitioning from 1st degree AVB to 2nd degree AVB with 2:1, P waves : QRS complexes for a run of 4 missed beats. Primary RN to consult Jerzy Rodriguez NP when she happened down the hallway to discuss. Beta-blockers held and cardiology re-consulted. Primary RN to place pacer pads on patient just to be safe.

## 2022-05-04 NOTE — PROGRESS NOTES
Hospitalist Progress Note    NAME: Last Jeong   :  1950   MRN:  [id number]     Interim Hospital Summary: 70 y.o. male whom presented on 2022 with      Assessment / Plan:  Community-acquired pneumonia with severe sepsis  Acute hypoxic respiratory failure secondary to above  Hemoptysis chronic (about 4 years per patient)   -Reports hemoptysis x 4 years (intermittent) and has seen Dr. Dayami Ochoa on outpatient basis. Had diagnostic bronchoscopy in 2019. Bronchial washings were negative for AFB, cultures were also negative including fungus. Cytology was negative for malignancy. -CT showed evidence of bilateral patchy pneumonia.    -Lactic is elevated at 2.16 and is tachycardic and has leukocytosis  -Rapid COVID is negative. Respiratory viral panel negative  -Procalcitonin normal  -Blood culture NGTD  -Continue empiric Rocephin and azithromycin, transition to doxycycline at discharge to complete 10 day course  -continue prednisone  -Down to 1 L nasal cannula. Patient has no qualifying diagnosis for home oxygen. We will try to see if the hospital can cover the cost, suspect he will only need it for 1 or 2 more weeks     Troponin elevation   -Denies chest pain; Troponin 153 > 92  -EKG normal sinus rhythm with nonspecific ST-T wave changes  -Echocardiogram EF 55 to 60%, normal wall motion  -Stress test showed distal inferior reversibility suggesting ischemia. -S/p cath on : NO significant disease   -Resume his amlodipine-benazepril 10-40 daily at discharge for his HTN management. Appreciate cardiology input    ? MGUS  -Had bone marrow biopsy in  suggesting non-IgM MGUS      30.0 - 39.9 Obese / Body mass index is 37.01 kg/m². Code status: Full  Prophylaxis: SCD's  Recommended Disposition: Home w/Family       Subjective:     Chief Complaint / Reason for Physician Visit  Follow up of pneumonia, hypoxemia  Chart reviewed in detail.   Discussed with RN events overnight. Review of Systems:  Symptom Y/N Comments  Symptom Y/N Comments   Fever/Chills    Chest Pain     Poor Appetite    Edema     Cough    Abdominal Pain     Sputum    Joint Pain     SOB/DUNBAR    Pruritis/Rash     Nausea/vomit    Tolerating PT/OT     Diarrhea    Tolerating Diet     Constipation    Other       Could NOT obtain due to:      PO intake: No data found. Objective:     VITALS:   Last 24hrs VS reviewed since prior progress note. Most recent are:  Patient Vitals for the past 24 hrs:   Temp Pulse Resp BP SpO2   05/04/22 1111 97.7 °F (36.5 °C) 72 16 130/70 95 %   05/04/22 0726 97.7 °F (36.5 °C) 66 18 (!) 143/67 93 %   05/04/22 0326 98 °F (36.7 °C) 69 18 (!) 151/80 95 %   05/04/22 0157 -- 64 -- 119/73 --   05/04/22 0137 -- 64 -- -- --   05/04/22 0130 -- (!) 47 -- -- --   05/03/22 2243 98.3 °F (36.8 °C) 87 16 127/77 94 %   05/03/22 1917 98.3 °F (36.8 °C) 85 16 115/75 93 %   05/03/22 1515 97.7 °F (36.5 °C) 84 16 (!) 141/83 93 %       Intake/Output Summary (Last 24 hours) at 5/4/2022 1351  Last data filed at 5/4/2022 2915  Gross per 24 hour   Intake 900 ml   Output --   Net 900 ml        I had a face to face encounter, and independently examined this patient on 5/4/2022, as outlined below:  PHYSICAL EXAM:  General: WD, WN. Alert, cooperative, no acute distress    EENT:  EOMI. Anicteric sclerae. MMM  Resp:  Diminished breath sounds, bases. no accessory muscle use  CV:  Regular  rhythm,  No edema  GI:  Soft, Non distended, Non tender. +Bowel sounds  Neurologic:  Alert and oriented X 3, normal speech,   Psych:   Good insight. Not anxious nor agitated  Skin:  No rashes.   No jaundice    Reviewed most current lab test results and cultures  YES  Reviewed most current radiology test results   YES  Review and summation of old records today    NO  Reviewed patient's current orders and MAR    YES  PMH/SH reviewed - no change compared to H&P  ________________________________________________________________________  Care Plan discussed with:    Comments   Patient x    Family      RN     Care Manager x    Consultant                        Multidiciplinary team rounds were held today with , nursing, pharmacist and clinical coordinator. Patient's plan of care was discussed; medications were reviewed and discharge planning was addressed. ________________________________________________________________________  Total NON critical care TIME:  15   Minutes    Total CRITICAL CARE TIME Spent:   Minutes non procedure based      Comments   >50% of visit spent in counseling and coordination of care x     This includes time during multidisciplinary rounds if indicated above   ________________________________________________________________________  Aixa Bertrand MD     Procedures: see electronic medical records for all procedures/Xrays and details which were not copied into this note but were reviewed prior to creation of Plan. LABS:  I reviewed today's most current labs and imaging studies.   Pertinent labs include:  Recent Labs     05/03/22 0422 05/02/22  0330   WBC 9.4 12.1*   HGB 8.8* 9.3*   HCT 27.5* 29.1*    299     Recent Labs     05/03/22 0422 05/02/22  0330    137   K 4.6 4.4    104   CO2 27 27   * 111*   BUN 14 14   CREA 0.75 0.93   CA 9.0 9.2

## 2022-05-04 NOTE — PROGRESS NOTES
Problem: Activity Intolerance  Goal: *Oxygen saturation during activity within specified parameters  Outcome: Resolved/Met  Goal: *Able to remain out of bed as prescribed  Outcome: Resolved/Met     Problem: Patient Education: Go to Patient Education Activity  Goal: Patient/Family Education  Outcome: Resolved/Met     Problem: Falls - Risk of  Goal: *Absence of Falls  Description: Document Catrachito Fall Risk and appropriate interventions in the flowsheet.   Outcome: Resolved/Met  Note: Fall Risk Interventions:            Medication Interventions: Teach patient to arise slowly                   Problem: Patient Education: Go to Patient Education Activity  Goal: Patient/Family Education  Outcome: Resolved/Met

## 2022-05-05 ENCOUNTER — PATIENT OUTREACH (OUTPATIENT)
Dept: CASE MANAGEMENT | Age: 72
End: 2022-05-05

## 2022-05-05 NOTE — DISCHARGE SUMMARY
Hospitalist Discharge Summary     Patient ID:  Pedro Pablo Dickey  [id number]  70 y.o.  1950    PCP on record: Mikki Geller MD    Admit date: 4/28/2022  Discharge date and time: 5/5/2022      DISCHARGE DIAGNOSIS:    Community-acquired pneumonia with severe sepsis  Acute hypoxic respiratory failure secondary to above  Hemoptysis chronic (about 4 years per patient)   ? MGUS    CONSULTATIONS:  IP CONSULT TO PULMONOLOGY  IP CONSULT TO CARDIOLOGY    Excerpted HPI from H&P of Jax Guzman MD:  CHIEF COMPLAINT: Shortness of breath and hemoptysis.     HISTORY OF PRESENT ILLNESS:     Bola Silverio is a 70 y.o.   male who presents with past medical history of prostate cancer is coming the hospital chief complaints of shortness of breath and also chronic hematemesis. Patient reports that he has been having hemoptysis since last 1 year and has followed up with pulmonary. He also reports shortness of breath since last 1 month which is even with minimal exertion, with cough and also blood in the sputum. He does not report any chest pain. Does not report any fever or chills. Denies any palpitations or syncopal episodes. He was recently put on an antibiotic and also prednisone due to wheezing. Did not report any abdominal pain, nausea or vomiting. Denies long distance travel, trauma or rash over the chest.  He recently saw pulmonologist Dr. Montana Dao who did a CT scan of chest which showed some pneumonia according to him.     On arrival to ED, he was hypoxic and was placed on nasal cannula. On labs WBC is 15.4 hemoglobin 10.5 platelet count 495. CMP is normal.  Troponin is 92.  proBNP is 94. CT of the chest shows patchy bilateral pneumonia    ______________________________________________________________________  DISCHARGE SUMMARY/HOSPITAL COURSE:  for full details see H&P, daily progress notes, labs, consult notes.      Community-acquired pneumonia with severe sepsis  Acute hypoxic respiratory failure secondary to above  Hemoptysis chronic (about 4 years per patient)   -Reports hemoptysis x 4 years (intermittent) and has seen Dr. Jose Flores on outpatient basis. Had diagnostic bronchoscopy in March 2019.  Bronchial washings were negative for AFB, cultures were also negative including fungus.  Cytology was negative for malignancy. -CT showed evidence of bilateral patchy pneumonia.    -Lactic is elevated at 2.16 and is tachycardic and has leukocytosis  -Rapid COVID is negative.  Respiratory viral panel negative  -Procalcitonin normal  -Blood culture NGTD  -Continue empiric Rocephin and azithromycin, transition to doxycycline at discharge to complete 10 day course  -continue prednisone  -Down to 1 L nasal cannula. Patient has no qualifying diagnosis for home oxygen. We will try to see if the hospital can cover the cost, suspect he will only need it for 1 or 2 more weeks     Troponin elevation, MI ruled out  -Denies chest pain; Troponin 153 > 92  -EKG normal sinus rhythm with nonspecific ST-T wave changes  -Echocardiogram EF 55 to 60%, normal wall motion  -Stress test showed distal inferior reversibility suggesting ischemia.   -S/p cath on 5/2: NO significant disease   -Resume his amlodipine-benazepril 10-40 daily at discharge for his HTN management. 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 Pertinent Findings:  Gen:    Not in distress  Chest: Clear lungs  CVS:   Regular rhythm.   No edema  Abd:  Soft, not distended, not tender  Neuro:  Alert, Oriented x 4, grossly non focal exam  _______________________________________________________________________  DISCHARGE MEDICATIONS:   Discharge Medication List as of 5/4/2022  5:27 PM      START taking these medications    Details   aspirin 81 mg chewable tablet Take 1 Tablet by mouth daily for 30 days. , Normal, Disp-30 Tablet, R-0      atorvastatin (LIPITOR) 40 mg tablet Take 1 Tablet by mouth nightly for 60 days. , Normal, Disp-30 Tablet, R-1      predniSONE (DELTASONE) 10 mg tablet Take 10 mg by mouth daily (with breakfast) for 30 days. , Normal, Disp-30 Tablet, R-0      doxycycline (MONODOX) 100 mg capsule Take 1 Capsule by mouth two (2) times a day for 4 days. , Normal, Disp-8 Capsule, R-0         CONTINUE these medications which have NOT CHANGED    Details   amLODIPine-benazepril (LOTREL) 10-40 mg per capsule Take 1 Capsule by mouth daily. , Historical Med      albuterol (PROAIR HFA) 90 mcg/actuation inhaler Take 1-2 Puffs by inhalation every four (4) hours as needed for Wheezing., Normal, Disp-1 Inhaler, R-0      ferrous sulfate (IRON) 325 mg (65 mg iron) tablet Take  by mouth Daily (before breakfast). , Historical Med      multivitamin (ONE A DAY) tablet Take 1 Tab by mouth daily. Historical Med, 1 Tab         STOP taking these medications       loratadine (CLARITIN) 10 mg tablet Comments:   Reason for Stopping:               My Recommended Diet, Activity, Wound Care, and follow-up labs are listed in the patient's Discharge Insturctions which I have personally completed and reviewed.   Risk of deterioration: Low    Condition at Discharge:  Stable  _____________________________________________________________________    Disposition  Home with family, no needs  ____________________________________________________________________    Care Plan discussed with:   Patient, Family, RN, Care Manager, Consultant    ____________________________________________________________________    Code Status: Full Code  ____________________________________________________________________      Condition at Discharge:  Stable  _____________________________________________________________________  Follow up with:   PCP : Maritza Alvarado MD  Follow-up Information     Follow up With Specialties Details Why Contact Info Moiz Osuna MD Internal Medicine Physician Go on 5/6/2022 at 11:15am for your PCP hospital follow up with NP Ronn Pena. [facility] [address]  [phone]      Luis Wei MD Pulmonary Disease In 2 weeks  [address]  [address] [facility]  [phone]      [employer]   this is your oxygen supplier.  Please contact them when you leave the hospital  [address]  [phone]              Total time in minutes spent coordinating this discharge (includes going over instructions, follow-up, prescriptions, and preparing report for sign off to her PCP) :  35 minutes    Signed:  Nessa Monroy MD

## 2022-05-05 NOTE — PROGRESS NOTES
Care Transitions Initial Call    Call within 2 business days of discharge: Yes     Patient: Jesús Pardo. Patient : 1950 MRN: [id number]    Last Discharge [address]       Complaint Diagnosis Description Type Department Provider    22 Shortness of Breath Pulmonary hemorrhage . .. ED to Hosp-Admission (Discharged) (ADMIT) Jacob Brambila MD; Larose Schaumann. .. Was this an external facility discharge? No     Challenges to be reviewed by the provider   Additional needs identified to be addressed with provider: yes  -at time of call patient did not have ASA, lipitor and doxycycline- will  today since pharmacy did not have ready yesterday update 1515 all medication picked up and taking  -slept without oxygen SPO2 was 79%, feeling dizzy and has headache,now wearing oxygen 2L SPO2 94% headache resolved and izziness better, BP was 117/71. Advised to wear oxygen at all times, monitor BP, SPO2 and bring reading to PCP appt on 22@ 1115. Patient given an opportunity to ask questions, repeated information, and verbalized understanding. Method of communication with provider : chart routing    Discussed COVID-19 related testing which was available at this time. Test results were negative. Patient informed of results, if available? no     Advance Care Planning:   Does patient have an Advance Directive:  not on file. education deferred to future outreach    Inpatient Readmission Risk score: Unplanned Readmit Risk Score: 7.7 ( )    Was this a readmission?  no   Patient stated reason for the admission: SOB    Patients top risk factors for readmission: level of motivation, medical condition-PNA and medication management   Interventions to address risk factors: Scheduled appointment with PCP-22, Obtained and reviewed discharge summary and/or continuity of care documents and Assessment and support for treatment adherence and medication management-comlet doxycycline x 4 days    Care Transition Nurse (CTN) contacted the patient by telephone to perform post hospital discharge assessment. Verified name and  with patient as identifiers. Provided introduction to self, and explanation of the CTN role. CTN reviewed discharge instructions, medical action plan and red flags with patient who verbalized understanding. Were discharge instructions available to patient? yes. Reviewed appropriate site of care based on symptoms and resources available to patient including: PCP, Specialist, When to call 911 and Condition related references. Patient given an opportunity to ask questions and does not have any further questions or concerns at this time. The patient agrees to contact the PCP office for questions related to their healthcare. Medication reconciliation was performed with patient, who verbalizes understanding of administration of home medications. Advised obtaining a 90-day supply of all daily and as-needed medications. Referral to Pharm D needed: no     Home Health/Outpatient orders at discharge: none    Durable Medical Equipment ordered at discharge: [address]: [address] received: yes    Discussed follow-up appointments. If no appointment was previously scheduled, appointment scheduling offered: na. Is follow up appointment scheduled within 7 days of discharge? yes. [facility] follow up appointment(s): No future appointments. Non-[facility] follow up appointment(s): PCP 22 @ 4982    Plan for follow-up call in 5-7 days based on severity of symptoms and risk factors. Plan for next call: symptom management-dizziness, headache, self management-wearing oxygen at all times, monitor BP and SPO2, follow up appointment-pcp and medication management-completion of doxycycline x 4 days  CTN provided contact information for future needs.     Goals Addressed                 This Visit's Progress     Prevent complications post hospitalization. 5/5/22   Home oxygen-O2 protocols (ie fall risk with tubing, no smoking, oxygen in use sign), pulse ox to monitor oxygen sats   Activity- activity intolerance/energy conservation/frequent rest, HH PT/OT to increase mobility and prevent falls.  Medication compliance complete doxycycline x 4 days   Attend PRAVEENA appt 5/6/22   Patient will monitor/report red flags- increased SOB, wheezing, worsening cough, excess phlegm/mucus, increased fatigue, using relief inhaler/nebulizer more often/medication not helping, [facility]  5/5/22 update 1515 patient reports dizziness and headache resolved and felling much better. Has picked up medications and taking as prescribed. CTN will follow up next week.  RIO

## 2022-05-10 ENCOUNTER — PATIENT OUTREACH (OUTPATIENT)
Dept: CASE MANAGEMENT | Age: 72
End: 2022-05-10

## 2022-05-17 ENCOUNTER — PATIENT OUTREACH (OUTPATIENT)
Dept: CASE MANAGEMENT | Age: 72
End: 2022-05-17

## 2022-05-18 NOTE — PROGRESS NOTES
Care Transitions Follow Up Call     Care Transition Nurse (CTN) contacted the patient by telephone to follow up after admission on 22. Verified name and  with patient as identifiers.     Addressed changes since last contact: Follow up appointment completed? yes. Was follow up appointment scheduled within 7 days of discharge? yes. CTN provided contact information for future needs. Plan for follow-up call in 5-7 days based on severity of symptoms and risk factors. Plan for next call: symptom management-SOC, cough and self management-SPO2 and IS     Goals Addressed                 This Visit's Progress     Prevent complications post hospitalization. 22   Home oxygen-O2 protocols (ie fall risk with tubing, no smoking, oxygen in use sign), pulse ox to monitor oxygen sats   Activity- activity intolerance/energy conservation/frequent rest, HH PT/OT to increase mobility and prevent falls.  Medication compliance complete doxycycline x 4 days   Attend PRAVEENA appt 22   Patient will monitor/report red flags- increased SOB, wheezing, worsening cough, excess phlegm/mucus, increased fatigue, using relief inhaler/nebulizer more often/medication not helping, [facility]  22 update 1515 patient reports dizziness and headache resolved and felling much better. Has picked up medications and taking as prescribed. CTN will follow up next week. [facility]  5/10/22 reports attendance of pcp follow up appt 22, SPO2 98% with 2L 91% without oxygen, walking in home, IS 2500 using 10 times per day, ABX completed. Endorses cough, denies, fever, chest pain, n/v/d. Patient will monitor red flags and SPO2 levels to report at next weeks outreach. [facility]  22 Patient called on mobile number on file. Message left on voicemail with contact information to return call to this writer. [facility]  22 Reports without oxygen to seep last night SPO2 was 92% when moving drops to 80'2 and with oxygen recovers to greater than 95%. Endorses cough, denies, fever, chest pain, n/v/d. Patient will monitor red flags and SPO2 levels to report at next weeks outreach.  RIO

## 2022-05-20 NOTE — PROGRESS NOTES
Care Transitions Follow Up Call    Care Transition Nurse (CTN) contacted the patient by telephone to follow up after admission on 22. Verified name and  with patient as identifiers. Addressed changes since last contact: Follow up appointment completed? yes. Was follow up appointment scheduled within 7 days of discharge? yes. [address] follow up appointment(s): No future appointments. Non-[facility] follow up appointment(s): PCP 22    CTN provided contact information for future needs. Plan for follow-up call in 5-7 days based on severity of symptoms and risk factors. Plan for next call: symptom management-cough, self management-IS, SPO2 and follow up appointment-pcp     Goals Addressed                 This Visit's Progress     Prevent complications post hospitalization. 22   Home oxygen-O2 protocols (ie fall risk with tubing, no smoking, oxygen in use sign), pulse ox to monitor oxygen sats   Activity- activity intolerance/energy conservation/frequent rest, HH PT/OT to increase mobility and prevent falls.  Medication compliance complete doxycycline x 4 days   Attend PRAVEENA appt 22   Patient will monitor/report red flags- increased SOB, wheezing, worsening cough, excess phlegm/mucus, increased fatigue, using relief inhaler/nebulizer more often/medication not helping, [facility]  22 update 1515 patient reports dizziness and headache resolved and felling much better. Has picked up medications and taking as prescribed. CTN will follow up next week. [facility]  5/10/22 reports attendance of pcp follow up appt 22, SPO2 98% with 2L 91% without oxygen, walking in home, IS 2500 using 10 times per day, ABX completed. Endorses cough, denies, fever, chest pain, n/v/d. Patient will monitor red flags and SPO2 levels to report at next weeks outreach.  [facility] Please notify patient lab results WNL.  DA/CNM

## 2022-05-25 ENCOUNTER — PATIENT OUTREACH (OUTPATIENT)
Dept: CASE MANAGEMENT | Age: 72
End: 2022-05-25

## 2022-05-25 NOTE — PROGRESS NOTES
Care Transitions Follow Up Call    Care Transition Nurse (CTN) contacted the patient by telephone to follow up after admission on 22. Verified name and  with patient as identifiers. Addressed changes since last contact: Follow up appointment completed? yes. Was follow up appointment scheduled within 7 days of discharge? yes. CTN provided contact information for future needs. Plan for follow-up call in 7-10 days based on severity of symptoms and risk factors. Plan for next call: symptom management-cough, oxygen level     Goals Addressed                 This Visit's Progress     Prevent complications post hospitalization. 22   Home oxygen-O2 protocols (ie fall risk with tubing, no smoking, oxygen in use sign), pulse ox to monitor oxygen sats   Activity- activity intolerance/energy conservation/frequent rest, HH PT/OT to increase mobility and prevent falls.  Medication compliance complete doxycycline x 4 days   Attend PRAVEENA appt 22   Patient will monitor/report red flags- increased SOB, wheezing, worsening cough, excess phlegm/mucus, increased fatigue, using relief inhaler/nebulizer more often/medication not helping, [facility]  22 update 1515 patient reports dizziness and headache resolved and felling much better. Has picked up medications and taking as prescribed. CTN will follow up next week. [facility]  5/10/22 reports attendance of pcp follow up appt 22, SPO2 98% with 2L 91% without oxygen, walking in home, IS 2500 using 10 times per day, ABX completed. Endorses cough, denies, fever, chest pain, n/v/d. Patient will monitor red flags and SPO2 levels to report at next weeks outreach. [facility]  22 Patient called on mobile number on file. Message left on voicemail with contact information to return call to this writer. [facility]  22 Reports without oxygen to seep last night SPO2 was 92% when moving drops to 80'2 and with oxygen recovers to greater than 95%.   Endorses cough, denies, fever, chest pain, n/v/d. Patient will monitor red flags and SPO2 levels to report at next weeks outreach. [facility]  5/25/22 reports attendance of pulm appt, ENT appt 6/8/22, SPO2 96% after breathing exercises. PNA and anemia education provided to patient. Patient will monitor SPO2, wear oxygen at [facility].  [facility]

## 2022-05-26 ENCOUNTER — TRANSCRIBE ORDER (OUTPATIENT)
Dept: SCHEDULING | Age: 72
End: 2022-05-26

## 2022-05-26 DIAGNOSIS — R91.8 GROUND GLASS OPACITY PRESENT ON IMAGING OF LUNG: Primary | ICD-10-CM

## 2022-05-29 PROBLEM — R79.89 ELEVATED TROPONIN: Status: RESOLVED | Noted: 2022-04-29 | Resolved: 2022-05-29

## 2022-05-29 PROBLEM — R77.8 ELEVATED TROPONIN: Status: RESOLVED | Noted: 2022-04-29 | Resolved: 2022-05-29

## 2022-06-02 ENCOUNTER — PATIENT OUTREACH (OUTPATIENT)
Dept: CASE MANAGEMENT | Age: 72
End: 2022-06-02

## 2022-06-02 NOTE — PROGRESS NOTES
Care Transitions Follow Up Call     Care Transition Nurse (CTN) contacted the patient by telephone to follow up after admission on 22. Verified name and  with patient as identifiers.     Addressed changes since last contact: Follow up appointment completed? yes. Was follow up appointment scheduled within 7 days of discharge? yes. [facility] follow up appointment(s):   Future Appointments   Date Time Provider Lauren Chong   6/10/2022  1:00 PM [facility] CT 3 [facility]     Non-[facility] follow up appointment(s): ENT 22    CTN provided contact information for future needs. Plan for follow-up call in 7-10 days based on severity of symptoms and risk factors. Plan for next call: follow up appointment-ENT     Goals Addressed                 This Visit's Progress     Prevent complications post hospitalization. 22   Home oxygen-O2 protocols (ie fall risk with tubing, no smoking, oxygen in use sign), pulse ox to monitor oxygen sats   Activity- activity intolerance/energy conservation/frequent rest, HH PT/OT to increase mobility and prevent falls.  Medication compliance complete doxycycline x 4 days   Attend PRAVEENA appt 22   Patient will monitor/report red flags- increased SOB, wheezing, worsening cough, excess phlegm/mucus, increased fatigue, using relief inhaler/nebulizer more often/medication not helping, hospitals  22 update 1515 patient reports dizziness and headache resolved and felling much better. Has picked up medications and taking as prescribed. CTN will follow up next week. hospitals  5/10/22 reports attendance of pcp follow up appt 22, SPO2 98% with 2L 91% without oxygen, walking in home, IS 2500 using 10 times per day, ABX completed. Endorses cough, denies, fever, chest pain, n/v/d. Patient will monitor red flags and SPO2 levels to report at next weeks outreach. hospitals  22 Patient called on mobile number on file.  Message left on voicemail with contact information to return call to this writer. [facility]  5/18/22 Reports without oxygen to seep last night SPO2 was 92% when moving drops to 80'2 and with oxygen recovers to greater than 95%. Endorses cough, denies, fever, chest pain, n/v/d. Patient will monitor red flags and SPO2 levels to report at next weeks outreach. [facility]  5/25/22 reports attendance of pulm appt, ENT appt 6/8/22, SPO2 96% after breathing exercises. PNA and anemia education provided to patient. Patient will monitor SPO2, wear oxygen at [facility]. [facility]  6/2/22 reports going longer periods of time without oxygen, wearing at night, endorses cough. Will report attendance of ENT appt on next week outreach.  [facility]

## 2022-06-10 ENCOUNTER — PATIENT OUTREACH (OUTPATIENT)
Dept: CASE MANAGEMENT | Age: 72
End: 2022-06-10

## 2022-06-10 ENCOUNTER — TRANSCRIBE ORDER (OUTPATIENT)
Dept: SCHEDULING | Age: 72
End: 2022-06-10

## 2022-06-10 ENCOUNTER — HOSPITAL ENCOUNTER (OUTPATIENT)
Dept: CT IMAGING | Age: 72
Discharge: HOME | End: 2022-06-10
Attending: NURSE PRACTITIONER
Payer: MEDICARE

## 2022-06-10 DIAGNOSIS — R91.8 GROUND GLASS OPACITY PRESENT ON IMAGING OF LUNG: Primary | ICD-10-CM

## 2022-06-10 DIAGNOSIS — R91.8 GROUND GLASS OPACITY PRESENT ON IMAGING OF LUNG: ICD-10-CM

## 2022-06-10 PROCEDURE — 74011000636 HC RX REV CODE- 636: Performed by: NURSE PRACTITIONER

## 2022-06-10 PROCEDURE — 70491 CT SOFT TISSUE NECK W/DYE: CPT

## 2022-06-10 PROCEDURE — 71250 CT THORAX DX C-: CPT

## 2022-06-10 RX ADMIN — IOPAMIDOL 100 ML: 755 INJECTION, SOLUTION INTRAVENOUS at 12:46

## 2022-06-10 NOTE — PROGRESS NOTES
Patient has graduated from the Transitions of Care Coordination  program on 6/10/22. Patient/family has the ability to self-manage at this time Care management goals have been completed. Patient was not referred to the [facility] team for further management. Goals Addressed                 This Visit's Progress     COMPLETED: Prevent complications post hospitalization. 5/5/22   Home oxygen-O2 protocols (ie fall risk with tubing, no smoking, oxygen in use sign), pulse ox to monitor oxygen sats   Activity- activity intolerance/energy conservation/frequent rest, HH PT/OT to increase mobility and prevent falls.  Medication compliance complete doxycycline x 4 days   Attend PRAVEENA appt 5/6/22   Patient will monitor/report red flags- increased SOB, wheezing, worsening cough, excess phlegm/mucus, increased fatigue, using relief inhaler/nebulizer more often/medication not helping, [facility]  5/5/22 update 1515 patient reports dizziness and headache resolved and felling much better. Has picked up medications and taking as prescribed. CTN will follow up next week. [facility]  5/10/22 reports attendance of pcp follow up appt 5/13/22, SPO2 98% with 2L 91% without oxygen, walking in home, IS 2500 using 10 times per day, ABX completed. Endorses cough, denies, fever, chest pain, n/v/d. Patient will monitor red flags and SPO2 levels to report at next weeks outreach. [facility]  5/17/22 Patient called on mobile number on file. Message left on voicemail with contact information to return call to this writer. [facility]  5/18/22 Reports without oxygen to seep last night SPO2 was 92% when moving drops to 80'2 and with oxygen recovers to greater than 95%. Endorses cough, denies, fever, chest pain, n/v/d. Patient will monitor red flags and SPO2 levels to report at next weeks outreach. [facility]  5/25/22 reports attendance of pulm appt, ENT appt 6/8/22, SPO2 96% after breathing exercises. PNA and anemia education provided to patient.  Patient will monitor SPO2, wear oxygen at next [employer] [facility]  6/2/22 reports going longer periods of time without oxygen, wearing at night, endorses cough. Will report attendance of ENT appt on next week outreach. [facility]  6/10/22 reports attendance of ENT appt. Per review of chart patient attended follow up appointments. Patient has had no ED or hospital admissions 30 days post discharge. Goal complete. [facility]             Patient has Care Transition Nurse's contact information for any further questions, concerns, or needs. Patients upcoming visits:  No future appointments.

## 2022-06-13 ENCOUNTER — TRANSCRIBE ORDER (OUTPATIENT)
Dept: SCHEDULING | Age: 72
End: 2022-06-13

## 2022-06-13 DIAGNOSIS — Z78.9 NON-SMOKER: ICD-10-CM

## 2022-06-13 DIAGNOSIS — R04.2 COUGHING UP BLOOD: Primary | ICD-10-CM

## 2022-06-13 DIAGNOSIS — Z00.8 OTHER SPECIFIED GENERAL MEDICAL EXAMINATION: ICD-10-CM

## 2023-03-22 NOTE — DISCHARGE INSTRUCTIONS
[address] [place] [address]  [phone]        Patient ID:  Ashley Jacobsen  [id number]  70 y.o.  1950    Admit Date: 4/28/2022    Discharge Date: 5/2/2022     Admitting Physician: Afua Dunham MD     Discharge Physician: Afua Dunham MD    Admission Diagnoses:   CAP (community acquired pneumonia) [J18.9]  Sepsis [facility]) [A41.9]  Hemoptysis [R04.2]    Discharge Diagnoses: Active Problems:    CAP (community acquired pneumonia) (4/28/2022)      Sepsis ([address] (4/28/2022)      Hemoptysis (4/28/2022)      Elevated troponin (4/29/2022)        Discharge Condition: Good    Cardiology Procedures this Admission:  Diagnostic left heart catheterization    Disposition: home    Reference discharge instructions provided by nursing for diet and activity. Signed:  Sahil Toledo NP  5/2/2022  12:31 PM        Radial Cardiac Catheterization/Angiography Discharge Instructions    It is normal to feel tired the first couple days. Take it easy and follow the physicians instructions. CHECK THE CATHETER INSERTION SITE DAILY:    Remove the wrist dressing 24 hours after the procedure. You may shower 24 hours after the procedure. Wash with soap and water and pat dry. Gentle cleaning of the site with soap and water is sufficient, cover with a dry clean dressing or bandage. Do not apply creams or powders to the area. No soaking the wrist for 3 days. Leave the puncture site open to air after 24 hours post-procedure. CALL THE PHYSICIANS:     If the site becomes red, swollen or feels warm to the touch  If there is bleeding or drainage or if there is unusual pain at the radial site. If there is any minor oozing, you may apply a band-aid and remove after 12 hours.    If the bleeding continues, hold pressure with the middle finger against the puncture site and the thumb against the back of the wrist,call 911 to be transported to the hospital.  DO NOT [address] [facility] ANYONE ELSE DRIVE YOU - CALL 153. ACTIVITY:   For the first 24 hours do not manipulate the wrist.  No lifting, pushing or pulling over 3-5 pounds with the affected wrist for 7 daysand no straining the insertion site. Do not life grocery bags or the garbage can, do not run the vacuum  or  for 7 days. Start with short walks as in the hospital and gradually increase as tolerated each day. It is recommended to walk 30 minutes 5-7 days per week. Follow your physicians instructions on activity. Avoid walking outside in extremes of heat or cold. Walk inside when it is cold and windy or hot and humid. Things to keep in mind:  No driving for at least 24 hours, or as designated by your physician. Limit the number of times you go up and down the stairs  Take rests and pace yourself with activity. Be careful and do not strain with bowel movements. MEDICATIONS:    Take all medications as prescribed  Call your physician if you have any questions  Keep an updated list of your medications with you at all times and give a list to your physician and pharmacist    SIGNS AND SYMPTOMS:   Be cautious of symptoms of angina or recurrent symptoms such as chest discomfort, unusual shortness of breath or fatigue. These could be symptoms of restenosis, a new blockage or a heart attack. If your symptoms are relieved with rest it is still recommended that you notify your physician of recurrent chest pain or discomfort. For CHEST PAIN or symptoms of angina not relieved with rest:  If the discomfort is not relieved with rest, and you have been prescribed Nitroglycerin, take as directed (taken under the tongue, one at a time 5 minutes apart for a total of 3 doses). If the discomfort is not relieved after the 3rd nitroglycerin, call 911. If you have not been prescribed Nitroglycerin  and your chest discomfort is not relieved with rest, call 911.      AFTER CARE:   Follow up with your physician as instructed. Follow a heart healthy diet with proper portion control, daily stress management, daily exercise, blood pressure and cholesterol control , and smoking cessation. [facility]

## 2023-04-08 ENCOUNTER — HOSPITAL ENCOUNTER (OUTPATIENT)
Age: 73
End: 2023-04-08
Attending: STUDENT IN AN ORGANIZED HEALTH CARE EDUCATION/TRAINING PROGRAM
Payer: COMMERCIAL

## 2023-04-23 DIAGNOSIS — R91.8 GROUND GLASS OPACITY PRESENT ON IMAGING OF LUNG: Primary | ICD-10-CM

## 2023-10-04 ENCOUNTER — HOSPITAL ENCOUNTER (OUTPATIENT)
Facility: HOSPITAL | Age: 73
Discharge: HOME | End: 2023-10-07
Payer: MEDICARE

## 2023-10-04 DIAGNOSIS — N50.89 SCROTAL MASS: ICD-10-CM

## 2023-10-04 PROCEDURE — 76870 US EXAM SCROTUM: CPT

## 2024-12-13 ENCOUNTER — HOSPITAL ENCOUNTER (OUTPATIENT)
Facility: HOSPITAL | Age: 74
Discharge: HOME | End: 2024-12-16
Attending: INTERNAL MEDICINE
Payer: MEDICARE

## 2024-12-13 DIAGNOSIS — C61 MALIGNANT NEOPLASM OF PROSTATE (HCC): ICD-10-CM

## 2024-12-13 DIAGNOSIS — D47.2 MONOCLONAL GAMMOPATHY: ICD-10-CM

## 2024-12-13 DIAGNOSIS — R79.89 OTHER SPECIFIED ABNORMAL FINDINGS OF BLOOD CHEMISTRY: ICD-10-CM

## 2024-12-13 PROCEDURE — 77075 RADEX OSSEOUS SURVEY COMPL: CPT

## 2025-03-18 ENCOUNTER — APPOINTMENT (OUTPATIENT)
Facility: HOSPITAL | Age: 75
End: 2025-03-18
Payer: MEDICARE

## 2025-03-18 ENCOUNTER — HOSPITAL ENCOUNTER (EMERGENCY)
Facility: HOSPITAL | Age: 75
Discharge: HOME | End: 2025-03-18
Attending: EMERGENCY MEDICINE
Payer: MEDICARE

## 2025-03-18 VITALS
BODY MASS INDEX: 37.1 KG/M2 | OXYGEN SATURATION: 91 % | HEART RATE: 81 BPM | SYSTOLIC BLOOD PRESSURE: 157 MMHG | TEMPERATURE: 98.1 F | WEIGHT: 244 LBS | DIASTOLIC BLOOD PRESSURE: 71 MMHG | RESPIRATION RATE: 20 BRPM

## 2025-03-18 DIAGNOSIS — R22.0 TONGUE SWELLING: ICD-10-CM

## 2025-03-18 DIAGNOSIS — T50.905A ADVERSE EFFECT OF DRUG, INITIAL ENCOUNTER: Primary | ICD-10-CM

## 2025-03-18 LAB
ALBUMIN SERPL-MCNC: 3.8 G/DL (ref 3.5–5)
ALBUMIN/GLOB SERPL: 0.8 (ref 1.1–2.2)
ALP SERPL-CCNC: 79 U/L (ref 45–117)
ALT SERPL-CCNC: 57 U/L (ref 12–78)
ANION GAP SERPL CALC-SCNC: 2 MMOL/L (ref 2–12)
AST SERPL-CCNC: 50 U/L (ref 15–37)
BASOPHILS # BLD: 0.08 K/UL (ref 0–0.1)
BASOPHILS NFR BLD: 0.9 % (ref 0–1)
BILIRUB SERPL-MCNC: 0.7 MG/DL (ref 0.2–1)
BUN SERPL-MCNC: 15 MG/DL (ref 6–20)
BUN/CREAT SERPL: 15 (ref 12–20)
CALCIUM SERPL-MCNC: 9.7 MG/DL (ref 8.5–10.1)
CHLORIDE SERPL-SCNC: 102 MMOL/L (ref 97–108)
CO2 SERPL-SCNC: 29 MMOL/L (ref 21–32)
CREAT SERPL-MCNC: 0.98 MG/DL (ref 0.7–1.3)
DIFFERENTIAL METHOD BLD: ABNORMAL
EOSINOPHIL # BLD: 0.43 K/UL (ref 0–0.4)
EOSINOPHIL NFR BLD: 4.8 % (ref 0–7)
ERYTHROCYTE [DISTWIDTH] IN BLOOD BY AUTOMATED COUNT: 15.4 % (ref 11.5–14.5)
GLOBULIN SER CALC-MCNC: 4.6 G/DL (ref 2–4)
GLUCOSE SERPL-MCNC: 89 MG/DL (ref 65–100)
HCT VFR BLD AUTO: 48.4 % (ref 36.6–50.3)
HGB BLD-MCNC: 16.1 G/DL (ref 12.1–17)
IMM GRANULOCYTES # BLD AUTO: 0.07 K/UL (ref 0–0.04)
IMM GRANULOCYTES NFR BLD AUTO: 0.8 % (ref 0–0.5)
LYMPHOCYTES # BLD: 2.02 K/UL (ref 0.8–3.5)
LYMPHOCYTES NFR BLD: 22.4 % (ref 12–49)
MCH RBC QN AUTO: 27.9 PG (ref 26–34)
MCHC RBC AUTO-ENTMCNC: 33.3 G/DL (ref 30–36.5)
MCV RBC AUTO: 83.9 FL (ref 80–99)
MONOCYTES # BLD: 0.75 K/UL (ref 0–1)
MONOCYTES NFR BLD: 8.3 % (ref 5–13)
NEUTS SEG # BLD: 5.66 K/UL (ref 1.8–8)
NEUTS SEG NFR BLD: 62.8 % (ref 32–75)
NRBC # BLD: 0 K/UL (ref 0–0.01)
NRBC BLD-RTO: 0 PER 100 WBC
PLATELET # BLD AUTO: 231 K/UL (ref 150–400)
PMV BLD AUTO: 10 FL (ref 8.9–12.9)
POTASSIUM SERPL-SCNC: 5.1 MMOL/L (ref 3.5–5.1)
PROT SERPL-MCNC: 8.4 G/DL (ref 6.4–8.2)
RBC # BLD AUTO: 5.77 M/UL (ref 4.1–5.7)
SODIUM SERPL-SCNC: 133 MMOL/L (ref 136–145)
WBC # BLD AUTO: 9 K/UL (ref 4.1–11.1)

## 2025-03-18 PROCEDURE — 70491 CT SOFT TISSUE NECK W/DYE: CPT

## 2025-03-18 PROCEDURE — 6360000004 HC RX CONTRAST MEDICATION: Performed by: EMERGENCY MEDICINE

## 2025-03-18 PROCEDURE — 6360000002 HC RX W HCPCS: Performed by: EMERGENCY MEDICINE

## 2025-03-18 PROCEDURE — 36415 COLL VENOUS BLD VENIPUNCTURE: CPT

## 2025-03-18 PROCEDURE — 2580000003 HC RX 258: Performed by: EMERGENCY MEDICINE

## 2025-03-18 PROCEDURE — 96374 THER/PROPH/DIAG INJ IV PUSH: CPT

## 2025-03-18 PROCEDURE — 2500000003 HC RX 250 WO HCPCS: Performed by: EMERGENCY MEDICINE

## 2025-03-18 PROCEDURE — 85025 COMPLETE CBC W/AUTO DIFF WBC: CPT

## 2025-03-18 PROCEDURE — 99285 EMERGENCY DEPT VISIT HI MDM: CPT

## 2025-03-18 PROCEDURE — 96375 TX/PRO/DX INJ NEW DRUG ADDON: CPT

## 2025-03-18 PROCEDURE — 80053 COMPREHEN METABOLIC PANEL: CPT

## 2025-03-18 RX ORDER — DEXAMETHASONE SODIUM PHOSPHATE 10 MG/ML
10 INJECTION, SOLUTION INTRAMUSCULAR; INTRAVENOUS ONCE
Status: COMPLETED | OUTPATIENT
Start: 2025-03-18 | End: 2025-03-18

## 2025-03-18 RX ORDER — DIPHENHYDRAMINE HYDROCHLORIDE 50 MG/ML
25 INJECTION, SOLUTION INTRAMUSCULAR; INTRAVENOUS
Status: COMPLETED | OUTPATIENT
Start: 2025-03-18 | End: 2025-03-18

## 2025-03-18 RX ORDER — IOPAMIDOL 755 MG/ML
100 INJECTION, SOLUTION INTRAVASCULAR
Status: COMPLETED | OUTPATIENT
Start: 2025-03-18 | End: 2025-03-18

## 2025-03-18 RX ORDER — METHYLPREDNISOLONE 4 MG/1
TABLET ORAL
Qty: 1 KIT | Refills: 0 | Status: SHIPPED | OUTPATIENT
Start: 2025-03-18 | End: 2025-03-24

## 2025-03-18 RX ADMIN — DEXAMETHASONE SODIUM PHOSPHATE 10 MG: 10 INJECTION, SOLUTION INTRAMUSCULAR; INTRAVENOUS at 17:58

## 2025-03-18 RX ADMIN — FAMOTIDINE 20 MG: 10 INJECTION, SOLUTION INTRAVENOUS at 17:58

## 2025-03-18 RX ADMIN — IOPAMIDOL 100 ML: 755 INJECTION, SOLUTION INTRAVENOUS at 18:28

## 2025-03-18 RX ADMIN — DIPHENHYDRAMINE HYDROCHLORIDE 25 MG: 50 INJECTION INTRAMUSCULAR; INTRAVENOUS at 17:58

## 2025-03-18 ASSESSMENT — LIFESTYLE VARIABLES
HOW OFTEN DO YOU HAVE A DRINK CONTAINING ALCOHOL: NEVER
HOW MANY STANDARD DRINKS CONTAINING ALCOHOL DO YOU HAVE ON A TYPICAL DAY: PATIENT DOES NOT DRINK

## 2025-03-18 NOTE — ED PROVIDER NOTES
[facility] EMERGENCY DEPARTMENT  EMERGENCY DEPARTMENT ENCOUNTER       Pt Name: Denisha Brown Sr.  MRN: [id number]  Birthdate 1950  Date of evaluation: 3/18/2025  Provider: Mac Yates MD   PCP: Buddy Machado MD  Note Started: 6:16 PM EDT 3/18/25     CHIEF COMPLAINT       Chief Complaint   Patient presents with    Sore Throat     Pt ambu to triage with c/o sore throat and tongue swelling. Symptoms x2 days. Pt reporting difficulty chewing and eating. Pt reporting the swelling has begun after starting Dupixent shots. Pt is breathing evenly and speaking in full sentences in triage.         HISTORY OF PRESENT ILLNESS: 1 or more elements      History From: patient, History limited by: none     Denisha Brown Sr. is a 74 y.o. male presents to the emergency department with a chief complaint of tongue swelling.    Patient reports that 2 weeks ago he was started on Dupixent.  Reports was doing well.  Saw reports saw the dentist 1 week ago.  Noted some pain in bilateral TMJ joints with \"clicking.\"  Reports this morning noted some swelling underneath his tongue as well as in the neck.  Denies any difficulty swallowing but does report intermittent sore throat.  No fevers or chills.  No chest pain or shortness of breath.    She went to get his Dupixent shot today was referred to the ED.       Please See MDM for Additional Details of the HPI/PMH  Nursing Notes were all reviewed and agreed with or any disagreements were addressed in the HPI.     REVIEW OF SYSTEMS        Positives and Pertinent negatives as per HPI.    PAST HISTORY     Past Medical History:  Past Medical History:   Diagnosis Date    Cancer (HCC)     prostate    Elevated troponin 4/29/2022       Past Surgical History:  Past Surgical History:   Procedure Laterality Date    ORTHOPEDIC SURGERY  1999    spinal fusion       Family History:  Family History   Problem Relation Age of Onset    Cancer Father     Lung Disease Mother        Social

## 2025-03-18 NOTE — DISCHARGE INSTRUCTIONS
You were seen in the emergency department for your symptoms.  Please continue to take steroids for short course of time.  Please follow-up with your primary care doctor as well as your eczema specialist as tongue swelling is a possible side effect of Dupixent.  Please return for new or worsening symptoms anytime.

## 2025-03-18 NOTE — ED NOTES
Verbal report received from Babs CERVANTES. Assumed care of this patient at this time. Patient is alert and oriented x 3. On the montior x2, side rails x2, call bell within reach. No acute distress at this time.

## (undated) DEVICE — Device

## (undated) DEVICE — SYR 10ML LUER LOK 1/5ML GRAD --

## (undated) DEVICE — YANKAUER BULB TIP, NO VENT: Brand: ARGYLE

## (undated) DEVICE — TUBING PRSS MON L6IN PVC M FEM CONN

## (undated) DEVICE — MEDI-VAC YANK SUCT HNDL W/TPRD BULBOUS TIP: Brand: CARDINAL HEALTH

## (undated) DEVICE — NEEDLE HYPO 18GA L1.5IN PNK S STL HUB POLYPR SHLD REG BVL

## (undated) DEVICE — KENDALL RADIOLUCENT FOAM MONITORING ELECTRODE RECTANGULAR SHAPE: Brand: KENDALL

## (undated) DEVICE — 3M™ TEGADERM™ TRANSPARENT FILM DRESSING FRAME STYLE, 1626W, 4 IN X 4-3/4 IN (10 CM X 12 CM), 50/CT 4CT/CASE: Brand: 3M™ TEGADERM™

## (undated) DEVICE — PROVE COVER: Brand: UNBRANDED

## (undated) DEVICE — NEONATAL-ADULT SPO2 SENSOR: Brand: NELLCOR

## (undated) DEVICE — DEVICE COMPR REG 24 CM VASC BND

## (undated) DEVICE — CATH IV AUTOGRD BC PNK 20GA 25 -- INSYTE

## (undated) DEVICE — SYRINGE MED 20ML STD CLR PLAS LUERSLIP TIP N CTRL DISP

## (undated) DEVICE — SYRINGE ANGIO 10 CC BRL STD PRNT POLYCARB LT BLU MEDALLION

## (undated) DEVICE — SET ADMIN 16ML TBNG L100IN 2 Y INJ SITE IV PIGGY BK DISP

## (undated) DEVICE — SPLINT WR POS F/ARTERIAL ACC -- BX/10

## (undated) DEVICE — HI-TORQUE VERSACORE FLOPPY GUIDE WIRE SYSTEM 145 CM: Brand: HI-TORQUE VERSACORE

## (undated) DEVICE — Z DISCONTINUED PER MEDLINE LINE GAS SAMPLING O2/CO2 LNG AD 13 FT NSL W/ TBNG FILTERLINE

## (undated) DEVICE — SOLIDIFIER MEDC 1200ML -- CONVERT TO 356117

## (undated) DEVICE — CATHETER ETER ANGIO L110CM OD5FR ID046IN L75CM 038IN 145DEG CARD

## (undated) DEVICE — (D)ATOMIZER LARYNGO TRACHAEL -- DISC BY MFR NO SUB

## (undated) DEVICE — STOPCOCK IV 4 W TRNSPAR

## (undated) DEVICE — FCPS BIOP PULM RAD JAW 100CML -- BX/10 M00515180

## (undated) DEVICE — GLIDESHEATH SLENDER ACCESS KIT: Brand: GLIDESHEATH SLENDER

## (undated) DEVICE — TOWEL 4 PLY TISS 19X30 SUE WHT

## (undated) DEVICE — SYR 3ML LL TIP 1/10ML GRAD --

## (undated) DEVICE — GUIDEWIRE VASC L260CM 0.035IN J TIP L3MM PTFE FIX COR NAMIC

## (undated) DEVICE — HEART CATH-MRMC: Brand: MEDLINE INDUSTRIES, INC.

## (undated) DEVICE — 1200 GUARD II KIT W/5MM TUBE W/O VAC TUBE: Brand: GUARDIAN

## (undated) DEVICE — SINGLE USE SUCTION VALVE MAJ-209: Brand: SINGLE USE SUCTION VALVE (STERILE)

## (undated) DEVICE — BASIN EMSIS 16OZ GRAPHITE PLAS KID SHP MOLD GRAD FOR ORAL

## (undated) DEVICE — SINGLE USE BIOPSY VALVE MAJ-210: Brand: SINGLE USE BIOPSY VALVE (STERILE)

## (undated) DEVICE — RADIFOCUS OPTITORQUE ANGIOGRAPHIC CATHETER: Brand: OPTITORQUE

## (undated) DEVICE — FALCON® SAMPLE CONTAINER, WITH LID, 4.5OZ (110ML), INDIVIDUALLY WRAPPED, STERILE, 100/CASE: Brand: FALCON A CORNING BRAND

## (undated) DEVICE — BAND COMPR L24CM REG CLR PLAS HEMSTAT EXT HK AND LOOP RETEN